# Patient Record
Sex: FEMALE | Race: WHITE | NOT HISPANIC OR LATINO | Employment: OTHER | URBAN - METROPOLITAN AREA
[De-identification: names, ages, dates, MRNs, and addresses within clinical notes are randomized per-mention and may not be internally consistent; named-entity substitution may affect disease eponyms.]

---

## 2017-02-09 ENCOUNTER — HOSPITAL ENCOUNTER (OUTPATIENT)
Dept: RADIOLOGY | Facility: HOSPITAL | Age: 42
Discharge: HOME | End: 2017-02-09
Attending: INTERNAL MEDICINE
Payer: MEDICARE

## 2017-02-09 DIAGNOSIS — Z12.31 ENCOUNTER FOR SCREENING MAMMOGRAM FOR MALIGNANT NEOPLASM OF BREAST: ICD-10-CM

## 2017-02-09 DIAGNOSIS — Z12.39 SCREENING FOR BREAST CANCER: ICD-10-CM

## 2017-02-09 PROCEDURE — 77063 BREAST TOMOSYNTHESIS BI: CPT | Mod: 26,,, | Performed by: RADIOLOGY

## 2017-02-09 PROCEDURE — 77067 SCR MAMMO BI INCL CAD: CPT | Mod: TC

## 2017-02-09 PROCEDURE — 77067 SCR MAMMO BI INCL CAD: CPT | Mod: 26,,, | Performed by: RADIOLOGY

## 2017-02-12 ENCOUNTER — TELEPHONE (OUTPATIENT)
Dept: FAMILY MEDICINE | Facility: CLINIC | Age: 42
End: 2017-02-12

## 2017-05-30 ENCOUNTER — OFFICE VISIT (OUTPATIENT)
Dept: FAMILY MEDICINE | Facility: CLINIC | Age: 42
End: 2017-05-30
Payer: MEDICARE

## 2017-05-30 VITALS
DIASTOLIC BLOOD PRESSURE: 75 MMHG | HEIGHT: 55 IN | SYSTOLIC BLOOD PRESSURE: 113 MMHG | WEIGHT: 142 LBS | HEART RATE: 69 BPM | BODY MASS INDEX: 32.86 KG/M2

## 2017-05-30 VITALS
HEART RATE: 69 BPM | BODY MASS INDEX: 33.07 KG/M2 | HEIGHT: 55 IN | WEIGHT: 142.88 LBS | SYSTOLIC BLOOD PRESSURE: 113 MMHG | DIASTOLIC BLOOD PRESSURE: 75 MMHG

## 2017-05-30 DIAGNOSIS — E03.4 HYPOTHYROIDISM DUE TO ACQUIRED ATROPHY OF THYROID: ICD-10-CM

## 2017-05-30 DIAGNOSIS — R53.83 FATIGUE, UNSPECIFIED TYPE: ICD-10-CM

## 2017-05-30 DIAGNOSIS — R78.71 ABNORMAL LEAD LEVEL IN BLOOD: ICD-10-CM

## 2017-05-30 DIAGNOSIS — E55.9 VITAMIN D DEFICIENCY: ICD-10-CM

## 2017-05-30 DIAGNOSIS — Q90.9 DOWN SYNDROME: ICD-10-CM

## 2017-05-30 DIAGNOSIS — Z13.1 SCREENING FOR DIABETES MELLITUS: ICD-10-CM

## 2017-05-30 DIAGNOSIS — Z00.00 ENCOUNTER FOR PREVENTIVE HEALTH EXAMINATION: Primary | ICD-10-CM

## 2017-05-30 DIAGNOSIS — K21.9 GERD WITHOUT ESOPHAGITIS: Chronic | ICD-10-CM

## 2017-05-30 DIAGNOSIS — E78.00 HYPERCHOLESTEREMIA: Chronic | ICD-10-CM

## 2017-05-30 DIAGNOSIS — E66.01 MORBID OBESITY WITH BMI OF 40.0-44.9, ADULT: Chronic | ICD-10-CM

## 2017-05-30 DIAGNOSIS — E66.01 MORBID OBESITY WITH BMI OF 40.0-44.9, ADULT: Primary | Chronic | ICD-10-CM

## 2017-05-30 DIAGNOSIS — I10 BENIGN HYPERTENSION: ICD-10-CM

## 2017-05-30 PROCEDURE — G0439 PPPS, SUBSEQ VISIT: HCPCS | Mod: S$GLB,,, | Performed by: NURSE PRACTITIONER

## 2017-05-30 PROCEDURE — 99999 PR PBB SHADOW E&M-EST. PATIENT-LVL III: CPT | Mod: PBBFAC,,, | Performed by: NURSE PRACTITIONER

## 2017-05-30 PROCEDURE — 99499 UNLISTED E&M SERVICE: CPT | Mod: S$GLB,,, | Performed by: INTERNAL MEDICINE

## 2017-05-30 PROCEDURE — 99999 PR PBB SHADOW E&M-EST. PATIENT-LVL III: CPT | Mod: PBBFAC,,, | Performed by: INTERNAL MEDICINE

## 2017-05-30 PROCEDURE — 99214 OFFICE O/P EST MOD 30 MIN: CPT | Mod: S$GLB,,, | Performed by: INTERNAL MEDICINE

## 2017-05-30 NOTE — PATIENT INSTRUCTIONS
Counseling and Referral of Other Preventative  (Italic type indicates deductible and co-insurance are waived)    Patient Name: Ira Cooley  Today's Date: 5/30/2017      SERVICE LIMITATIONS RECOMMENDATION    Vaccines    · Pneumococcal (once after 65)    · Influenza (annually)    · Hepatitis B (if medium/high risk)    · Prevnar 13      Hepatitis B medium/high risk factors:       - End-stage renal disease       - Hemophiliacs who received Factor VII or         IX concentrates       - Clients of institutions for the mentally             retarded       - Persons who live in the same house as          a HepB carrier       - Homosexual men       - Illicit injectable drug abusers     Pneumococcal: N/A     Influenza: N/A     Hepatitis B: N/A     Prevnar 13: N/A    Mammogram (biennial age 50-74)  Annually (age 40 or over)  Done this year, repeat every year    Pap (up to age 70 and after 70 if unknown history or abnormal study last 10 years)    Recommended to patient, declined     The USPSTF recommends screening for cervical cancer in women age 21 to 65 years with cytology (Pap smear) every 3 years.     Colorectal cancer screening (to age 75)    · Fecal occult blood test (annual)  · Flexible sigmoidoscopy (5y)  · Screening colonoscopy (10y)  · Barium enema   N/A    Diabetes self-management training (no USPSTF recommendations)  Requires referral by treating physician for patient with diabetes or renal disease. 10 hours of initial DSMT sessions of no less than 30 minutes each in a continuous 12-month period. 2 hours of follow-up DSMT in subsequent years.  N/A    Bone mass measurements (age 65 & older, biennial)  Requires diagnosis related to osteoporosis or estrogen deficiency. Biennial benefit unless patient has history of long-term glucocorticoid  N/A    Glaucoma screening (no USPSTF recommendation)  Diabetes mellitus, family history   , age 50 or over    American, age 65 or over  N/A    Medical  nutrition therapy for diabetes or renal disease (no recommended schedule)  Requires referral by treating physician for patient with diabetes or renal disease or kidney transplant within the past 3 years.  Can be provided in same year as diabetes self-management training (DSMT), and CMS recommends medical nutrition therapy take place after DSMT. Up to 3 hours for initial year and 2 hours in subsequent years.  N/A    Cardiovascular screening blood tests (every 5 years)  · Fasting lipid panel  Order as a panel if possible  Last done 2016, recommend to repeat every 5  years    Diabetes screening tests (at least every 3 years, Medicare covers annually or at 6-month intervals for prediabetic patients)  · Fasting blood sugar (FBS) or glucose tolerance test (GTT)  Patient must be diagnosed with one of the following:       - Hypertension       - Dyslipidemia       - Obesity (BMI 30kg/m2)       - Previous elevated impaired FBS or GTT       ... or any two of the following:       - Overweight (BMI 25 but <30)       - Family history of diabetes       - Age 65 or older       - History of gestational diabetes or birth of baby weighing more than 9 pounds  Last done 2016, recommend to repeat every 1  years    Abdominal aortic aneurysm screening (once)  · Sonogram   Limited to patients who meet one of the following criteria:       - Men who are 65-75 years old and have smoked more than 100 cigarette in their lifetime       - Anyone with a family history of abdominal aortic aneurysm       - Anyone recommended for screening by the USPSTF  N/A    HIV screening (annually for increased risk patients)  · HIV-1 and HIV-2 by EIA, or PENELOPE, rapid antibody test or oral mucosa transudate  Patients must be at increased risk for HIV infection per USPSTF guidelines or pregnant. Tests covered annually for patient at increased risk or as requested by the patient. Pregnant patients may receive up to 3 tests during pregnancy.  Risks discussed,  screening is not recommended    Smoking cessation counseling (up to 8 sessions per year)  Patients must be asymptomatic of tobacco-related conditions to receive as a preventative service.  Non-smoker    Subsequent annual wellness visit  At least 12 months since last AWV  Return in one year     The following information is provided to all patients.  This information is to help you find resources for any of the problems found today that may be affecting your health:                Living healthy guide: www.[facility].[place].[facility]      Understanding Diabetes: www.diabetes.org      Eating healthy: www.cdc.gov/healthyweight      [facility] home safety checklist: www.cdc.gov/steadi/patient.html      Agency on Aging: www.goea.[place]      Alcoholics anonymous (AA): www.aa.org      Physical Activity: www.justin.nih.gov/fu5jutd      Tobacco use: www.quitwithusla.org

## 2017-05-30 NOTE — PATIENT INSTRUCTIONS
Reviewed recent lab,discussed  risk factors, and recommended to continue his wellness program, exercises, diet, meds, and  wt. Control. See dictation.zee

## 2017-05-30 NOTE — PROGRESS NOTES
A 41-year-old white female with Down's syndrome presents with her mother today   for reevaluation and monitoring of her medical problems and condition.    During the interim, the patient has had no significant complaints, ER visits or   hospitalizations since the last visit on 11/30/2016.  She seem to be doing well   at the present time and importantly has finally entered into an exercise   activity and weight reduction-type program, which we have been counseling and   advising in the past.  She seems to feel better, but her weight is approximately   at the same at 141 pounds.  BMI estimated as morbidly obese, but questionable   regarding her height, weight and Down's syndrome.  Regarding   hypercholesterolemia, the patient is on Pravachol because of a significant   elevation.  We will repeat again as she is due her comprehensive lab.    Regarding Down's syndrome, stable, is under the care and apparently has had no   behavior abnormalities or depression or further symptoms of anxiety, etc.    Hypothyroidism, on replacement therapy and we will repeat again the TSH.  No   definite historic elements of replacement, abnormality or need.  Fatigue noted   in the past, but improved with her exercise recently.    The patient has had GERD in the past, but has been under complete control.    Indication, risk and benefits of possibly discontinuation if possible without   symptoms and will advise the mother and the patient regarding this because of   long-term side effects and will call back if symptoms return.    REVIEW OF SYSTEMS:  GENERAL:  Otherwise unremarkable.  There have been no evidence of any fever,   chills or sweats.  HEENT:  No visual changes, headache or syncope, but reminded again regarding her   need for routine eye exam.  RESPIRATORY:  No wheeze, cough or shortness of breath.  HEART:  No angina pain.  No palpitation or edema.  GASTROINTESTINAL:  Appetite fairly good and no change.  No change in BMs, melena   or  hematochezia, dysuria, hematuria or renal colic.  NEUROLOGIC:  No evidence of depression or change in behavior, weakness or   syncopal-like spells or tremor by history.    Immunizations are reviewed, up-to-date and will be due her Prevnar 13 come November.    PHYSICAL EXAMINATION:  GENERAL:  Reveals a well-developed, well-nourished overweight white female.  VITAL SIGNS:  See vital signs and measurement.  ARTERIES AND VEINS:  Good peripheral pulsation.  Normal carotid upstroke and   amplitude.  There was no edema or phlebitis.  NODES:  None felt in the neck, submandibular or supraclavicular region.  NEUROLOGICAL:  Oriented x3.  Relatively good motor tone and strength.  Cranial   nerves grossly intact II-XII.  Gait normal.  No fall risk observed.  No tremor.  HEENT:  Pupils are equal and reactive to light and accommodation.  External eye   movements were intact.  NECK:  No masses or thyroid.  LUNGS:  Clear.  Distant tones.  HEART:  Regular rhythm, 68 beats per minute.  No gallop or arrhythmia.  ABDOMINAL:  Soft.  No localized pain or tenderness.  No organs or masses felt at   this time.  Bowel tones are present.  EXTREMITIES:  Again, no edema, acute arthritis or phlebitis.    IMPRESSION:  At this time:  1.  Obesity, stable and improved.  See above discussion and recommendations to   continue lifestyle measures and emphasized weight reduction with her wellness   program.  2.  Hypercholesterolemia.  We will reevaluate with lab and continue present   medications  3.  Down's syndrome.  See above discussion and no significant change or   abnormality by history.  4.  Hypothyroidism, euthyroid.  We will repeat a TSH to confirm and need for any   change in replacement.  5.  GERD, stable.  We will attempt again discontinuation of her Prilosec if   possible because of long-term side effects.  Call or contact if symptoms recur   and place the medicine back on her med list.    The patient and mother both were counseled  regarding her present condition, to   continue her wellness program, medications and laboratory work and we will   review and contact regarding the results.  Routine visit in six months or as   needed.  Precautions given regarding any infection or change in her symptoms.      WTB/PN  dd: 05/30/2017 11:12:36 (CDT)  td: 05/31/2017 08:06:06 (CDT)  Doc ID   #[id number]  Job ID #[id number]    CC:    This office note has been dictated.

## 2017-05-30 NOTE — PROGRESS NOTES
"Ira Cooley presented for a  Medicare AWV and comprehensive Health Risk Assessment today. The following components were reviewed and updated:    · Medical history  · Family History  · Social history  · Allergies and Current Medications  · Health Risk Assessment  · Health Maintenance  · Care Team     ** See Completed Assessments for Annual Wellness Visit within the encounter summary.**       The following assessments were completed:  · Living Situation  · CAGE  · Depression Screening  · Timed Get Up and Go  · Whisper Test  · Cognitive Function Screening  · Nutrition Screening  · ADL Screening  · PAQ Screening    Vitals:    05/30/17 0907   BP: 113/75   BP Location: Right arm   Patient Position: Sitting   BP Method: Automatic   Pulse: 69   Weight: 64.8 kg (142 lb 13.7 oz)   Height: 4' 2" (1.27 m)     Body mass index is 40.18 kg/m².  Physical Exam   Constitutional: She appears well-developed and well-nourished. No distress.   HENT:   Head: Atraumatic.   Cardiovascular: Normal rate, regular rhythm and normal heart sounds.    No murmur heard.  Pulmonary/Chest: Effort normal and breath sounds normal. No respiratory distress. She has no wheezes.   Neurological: She is alert.   Skin: Skin is warm and dry.   Psychiatric: She has a normal mood and affect. Her behavior is normal. Judgment and thought content normal.         Diagnoses and health risks identified today and associated recommendations/orders:    1. Encounter for preventive health examination  Recommend yearly HRA visit    2. Morbid obesity with BMI of 40.0-44.9, adult  Encourage healthy food choices, portion control  Increase daily physical activity  Followed by Dave Hahn MD .       3. Hypercholesteremia  Continue to monitor  Taking statin  Encourage healthy food choices  Followed by Dave Hahn MD .       4. Down syndrome  Stable.     Followed by Dave Hahn MD .       5. Hypothyroidism due to acquired atrophy of thyroid  Stable.   Taking " synthroid  Followed by Dave Hahn MD .       Discussed pap smear, pt declines at this time  Provided Ira with a 5-10 year written screening schedule and personal prevention plan. Recommendations were developed using the USPSTF age appropriate recommendations. Education, counseling, and referrals were provided as needed. After Visit Summary printed and given to patient which includes a list of additional screenings\tests needed.    Return in about 1 year (around 5/30/2018).    Matilde Allan NP

## 2017-06-01 ENCOUNTER — LAB VISIT (OUTPATIENT)
Dept: LAB | Facility: HOSPITAL | Age: 42
End: 2017-06-01
Attending: INTERNAL MEDICINE
Payer: MEDICARE

## 2017-06-01 DIAGNOSIS — R53.83 FATIGUE, UNSPECIFIED TYPE: ICD-10-CM

## 2017-06-01 DIAGNOSIS — E78.00 HYPERCHOLESTEREMIA: Chronic | ICD-10-CM

## 2017-06-01 DIAGNOSIS — Z13.1 SCREENING FOR DIABETES MELLITUS: ICD-10-CM

## 2017-06-01 DIAGNOSIS — R78.71 ABNORMAL LEAD LEVEL IN BLOOD: ICD-10-CM

## 2017-06-01 DIAGNOSIS — I10 BENIGN HYPERTENSION: ICD-10-CM

## 2017-06-01 DIAGNOSIS — E55.9 VITAMIN D DEFICIENCY: ICD-10-CM

## 2017-06-01 LAB
25(OH)D3+25(OH)D2 SERPL-MCNC: 59 NG/ML
ALBUMIN SERPL BCP-MCNC: 3.5 G/DL
ALP SERPL-CCNC: 85 U/L
ALT SERPL W/O P-5'-P-CCNC: 24 U/L
ANION GAP SERPL CALC-SCNC: 13 MMOL/L
AST SERPL-CCNC: 25 U/L
BASOPHILS # BLD AUTO: 0.07 K/UL
BASOPHILS NFR BLD: 1.4 %
BILIRUB SERPL-MCNC: 0.6 MG/DL
BUN SERPL-MCNC: 10 MG/DL
CALCIUM SERPL-MCNC: 9.3 MG/DL
CHLORIDE SERPL-SCNC: 106 MMOL/L
CHOLEST/HDLC SERPL: 4.3 {RATIO}
CO2 SERPL-SCNC: 21 MMOL/L
CREAT SERPL-MCNC: 0.7 MG/DL
DIFFERENTIAL METHOD: ABNORMAL
EOSINOPHIL # BLD AUTO: 0.1 K/UL
EOSINOPHIL NFR BLD: 2.6 %
ERYTHROCYTE [DISTWIDTH] IN BLOOD BY AUTOMATED COUNT: 14 %
EST. GFR  (AFRICAN AMERICAN): >60 ML/MIN/1.73 M^2
EST. GFR  (NON AFRICAN AMERICAN): >60 ML/MIN/1.73 M^2
ESTIMATED AVG GLUCOSE: 120 MG/DL
GLUCOSE SERPL-MCNC: 93 MG/DL
HBA1C MFR BLD HPLC: 5.8 %
HCT VFR BLD AUTO: 43.5 %
HDL/CHOLESTEROL RATIO: 23 %
HDLC SERPL-MCNC: 200 MG/DL
HDLC SERPL-MCNC: 46 MG/DL
HGB BLD-MCNC: 14.5 G/DL
LDLC SERPL CALC-MCNC: 117.8 MG/DL
LYMPHOCYTES # BLD AUTO: 1.3 K/UL
LYMPHOCYTES NFR BLD: 26.9 %
MCH RBC QN AUTO: 33.3 PG
MCHC RBC AUTO-ENTMCNC: 33.3 %
MCV RBC AUTO: 100 FL
MONOCYTES # BLD AUTO: 0.4 K/UL
MONOCYTES NFR BLD: 8.3 %
NEUTROPHILS # BLD AUTO: 3 K/UL
NEUTROPHILS NFR BLD: 60.4 %
NONHDLC SERPL-MCNC: 154 MG/DL
PLATELET # BLD AUTO: 272 K/UL
PMV BLD AUTO: 10.2 FL
POTASSIUM SERPL-SCNC: 4.4 MMOL/L
PROT SERPL-MCNC: 7.1 G/DL
RBC # BLD AUTO: 4.36 M/UL
SODIUM SERPL-SCNC: 140 MMOL/L
TRIGL SERPL-MCNC: 181 MG/DL
TSH SERPL DL<=0.005 MIU/L-ACNC: 2.04 UIU/ML
WBC # BLD AUTO: 4.95 K/UL

## 2017-06-01 PROCEDURE — 84443 ASSAY THYROID STIM HORMONE: CPT

## 2017-06-01 PROCEDURE — 85025 COMPLETE CBC W/AUTO DIFF WBC: CPT

## 2017-06-01 PROCEDURE — 80061 LIPID PANEL: CPT

## 2017-06-01 PROCEDURE — 80053 COMPREHEN METABOLIC PANEL: CPT

## 2017-06-01 PROCEDURE — 83036 HEMOGLOBIN GLYCOSYLATED A1C: CPT

## 2017-06-01 PROCEDURE — 36415 COLL VENOUS BLD VENIPUNCTURE: CPT | Mod: PO

## 2017-06-01 PROCEDURE — 82306 VITAMIN D 25 HYDROXY: CPT

## 2017-06-05 ENCOUNTER — TELEPHONE (OUTPATIENT)
Dept: FAMILY MEDICINE | Facility: CLINIC | Age: 42
End: 2017-06-05

## 2017-06-05 NOTE — TELEPHONE ENCOUNTER
----- Message from Suzanne Ambrose sent at 6/5/2017  9:41 AM CDT -----  Contact: Patient's Mother  Patient's Mother called advising that she received a missed call from Dr. Hahn's office with the test results of her daughter.  Please call mother back at [phone].  Patient's mother advised she is leaving the house in a little bit and may not be able to reach her phone.  She would like us to call again if she does not answer.  Thank you!

## 2017-07-28 DIAGNOSIS — E03.4 HYPOTHYROIDISM DUE TO ACQUIRED ATROPHY OF THYROID: ICD-10-CM

## 2017-07-30 RX ORDER — LEVOTHYROXINE SODIUM 25 UG/1
TABLET ORAL
Qty: 90 TABLET | Refills: 3 | Status: SHIPPED | OUTPATIENT
Start: 2017-07-30 | End: 2018-08-29 | Stop reason: SDUPTHER

## 2017-09-12 ENCOUNTER — OFFICE VISIT (OUTPATIENT)
Dept: OBSTETRICS AND GYNECOLOGY | Facility: CLINIC | Age: 42
End: 2017-09-12
Payer: MEDICARE

## 2017-09-12 ENCOUNTER — LAB VISIT (OUTPATIENT)
Dept: LAB | Facility: HOSPITAL | Age: 42
End: 2017-09-12
Attending: OBSTETRICS & GYNECOLOGY
Payer: MEDICARE

## 2017-09-12 VITALS
BODY MASS INDEX: 39.18 KG/M2 | DIASTOLIC BLOOD PRESSURE: 74 MMHG | WEIGHT: 139.31 LBS | SYSTOLIC BLOOD PRESSURE: 122 MMHG

## 2017-09-12 DIAGNOSIS — N91.2 ABSENT MENSES: Primary | ICD-10-CM

## 2017-09-12 DIAGNOSIS — N91.2 ABSENT MENSES: ICD-10-CM

## 2017-09-12 LAB
ESTRADIOL SERPL-MCNC: 13 PG/ML
FSH SERPL-ACNC: 66.3 MIU/ML
TSH SERPL DL<=0.005 MIU/L-ACNC: 2.31 UIU/ML

## 2017-09-12 PROCEDURE — 3008F BODY MASS INDEX DOCD: CPT | Mod: S$GLB,,, | Performed by: OBSTETRICS & GYNECOLOGY

## 2017-09-12 PROCEDURE — 99203 OFFICE O/P NEW LOW 30 MIN: CPT | Mod: S$GLB,,, | Performed by: OBSTETRICS & GYNECOLOGY

## 2017-09-12 PROCEDURE — 99999 PR PBB SHADOW E&M-EST. PATIENT-LVL III: CPT | Mod: PBBFAC,,, | Performed by: OBSTETRICS & GYNECOLOGY

## 2017-09-12 PROCEDURE — 83001 ASSAY OF GONADOTROPIN (FSH): CPT

## 2017-09-12 PROCEDURE — 82670 ASSAY OF TOTAL ESTRADIOL: CPT

## 2017-09-12 PROCEDURE — 84443 ASSAY THYROID STIM HORMONE: CPT

## 2017-09-12 PROCEDURE — 3078F DIAST BP <80 MM HG: CPT | Mod: S$GLB,,, | Performed by: OBSTETRICS & GYNECOLOGY

## 2017-09-12 PROCEDURE — 3074F SYST BP LT 130 MM HG: CPT | Mod: S$GLB,,, | Performed by: OBSTETRICS & GYNECOLOGY

## 2017-09-12 PROCEDURE — 36415 COLL VENOUS BLD VENIPUNCTURE: CPT | Mod: PO

## 2017-09-12 NOTE — PROGRESS NOTES
Chief Complaint   Patient presents with    Well Woman    Absent Menses       History of Present Illness: Ira Cooley is a 41 y.o. female that presents today 2017 for   Chief Complaint   Patient presents with    Well Woman    Absent Menses         Past Medical History:   Diagnosis Date    GERD (gastroesophageal reflux disease)     Thyroid activity decreased        Past Surgical History:   Procedure Laterality Date    TONSILLECTOMY         Current Outpatient Prescriptions   Medication Sig Dispense Refill    BIOTIN ORAL Take 1 tablet by mouth once daily.        ERGOCALCIFEROL, VITAMIN D2, (VITAMIN D2 ORAL) Take by mouth once daily.      levothyroxine (SYNTHROID) 25 MCG tablet TAKE 1 TABLET EVERY DAY 90 tablet 3    MULTIVIT WITH CALCIUM,IRON,MIN (MULTIPLE VITAMIN, WOMENS ORAL) Take by mouth.        pravastatin (PRAVACHOL) 40 MG tablet TAKE 1 TABLET ONE TIME DAILY 90 tablet 3    vitamin E 100 UNIT capsule Take 100 Units by mouth once daily.         No current facility-administered medications for this visit.        Review of patient's allergies indicates:   Allergen Reactions    Adacel (adolescent & adult) [diph,pertuss(acel),tet vac(pf)] Other (See Comments)     Redness, pain, and induration at injection site.       Family History   Problem Relation Age of Onset    Cataracts Maternal Aunt     Diabetes Maternal Aunt     Stroke Maternal Aunt     Thyroid disease Maternal Aunt     Cancer Maternal Aunt     Cancer Maternal Uncle     Cancer Maternal Grandmother     Cancer Maternal Grandfather        Social History   Substance Use Topics    Smoking status: Never Smoker    Smokeless tobacco: Never Used    Alcohol use No       OB History    Para Term  AB Living   0 0 0 0 0 0   SAB TAB Ectopic Multiple Live Births   0 0 0 0 0             Review of Symptoms:  GENERAL: Denies weight gain or weight loss. Feeling well overall.   SKIN: Denies rash or lesions.   HEAD: Denies head injury or  headache.   NODES: Denies enlarged lymph nodes.   CHEST: Denies chest pain or shortness of breath.   CARDIOVASCULAR: Denies palpitations or left sided chest pain.   ABDOMEN: No abdominal pain, constipation, diarrhea, nausea, vomiting or rectal bleeding.   URINARY: No frequency, dysuria, hematuria, or burning on urination.  HEMATOLOGIC: No easy bruisability or excessive bleeding.   MUSCULOSKELETAL: Denies joint pain or swelling.     /74   Wt 63.2 kg (139 lb 5.3 oz)       ASSESSMENT/PLAN:  Absent menses  -     Follicle stimulating hormone; Future; Expected date: 09/12/2017  -     Estradiol; Future; Expected date: 09/12/2017  -     TSH; Future; Expected date: 09/12/2017        30 minutes spent today with greater than half in counseling.

## 2017-10-26 ENCOUNTER — TELEPHONE (OUTPATIENT)
Dept: OBSTETRICS AND GYNECOLOGY | Facility: CLINIC | Age: 42
End: 2017-10-26

## 2017-10-26 DIAGNOSIS — N95.0 POSTMENOPAUSAL BLEEDING: Primary | ICD-10-CM

## 2017-10-26 NOTE — TELEPHONE ENCOUNTER
----- Message from John Jordan sent at 10/26/2017  1:26 PM CDT -----  Contact: Mom,Yessenia  Yessenia want to speak with a nurse regarding patient period please call back at [phone]

## 2017-10-26 NOTE — TELEPHONE ENCOUNTER
Pts mother states she hasn't had a cycle since may, was seen for absent menses and told she was in menopause. She started cycle yesterday please advise.

## 2017-10-31 ENCOUNTER — HOSPITAL ENCOUNTER (OUTPATIENT)
Dept: RADIOLOGY | Facility: HOSPITAL | Age: 42
Discharge: HOME | End: 2017-10-31
Attending: OBSTETRICS & GYNECOLOGY
Payer: MEDICARE

## 2017-10-31 DIAGNOSIS — N95.0 POSTMENOPAUSAL BLEEDING: ICD-10-CM

## 2017-10-31 PROCEDURE — 76856 US EXAM PELVIC COMPLETE: CPT | Mod: TC

## 2017-10-31 PROCEDURE — 76856 US EXAM PELVIC COMPLETE: CPT | Mod: 26,,, | Performed by: RADIOLOGY

## 2017-11-16 RX ORDER — PRAVASTATIN SODIUM 40 MG/1
TABLET ORAL
Qty: 90 TABLET | Refills: 3 | Status: SHIPPED | OUTPATIENT
Start: 2017-11-16 | End: 2018-08-29 | Stop reason: SDUPTHER

## 2017-12-29 ENCOUNTER — TELEPHONE (OUTPATIENT)
Dept: FAMILY MEDICINE | Facility: CLINIC | Age: 42
End: 2017-12-29

## 2017-12-29 NOTE — TELEPHONE ENCOUNTER
----- Message from Sonya Blanco sent at 12/28/2017  3:15 PM CST -----  Contact: Mother-Pedrito [phone]  Patient's mother returning the nurse phone call.Thanks!

## 2017-12-29 NOTE — TELEPHONE ENCOUNTER
Left message for mother to call clinic for any further needs. Has appointment scheduled with Dr Pretty, advised to schedule with  NP if sooner appointment is needed.

## 2018-03-26 ENCOUNTER — OFFICE VISIT (OUTPATIENT)
Dept: FAMILY MEDICINE | Facility: CLINIC | Age: 43
End: 2018-03-26
Payer: MEDICARE

## 2018-03-26 VITALS
RESPIRATION RATE: 18 BRPM | DIASTOLIC BLOOD PRESSURE: 62 MMHG | HEART RATE: 93 BPM | WEIGHT: 137.81 LBS | OXYGEN SATURATION: 95 % | SYSTOLIC BLOOD PRESSURE: 108 MMHG | HEIGHT: 55 IN | BODY MASS INDEX: 31.89 KG/M2

## 2018-03-26 DIAGNOSIS — Z00.00 ROUTINE PHYSICAL EXAMINATION: Primary | ICD-10-CM

## 2018-03-26 DIAGNOSIS — E03.4 HYPOTHYROIDISM DUE TO ACQUIRED ATROPHY OF THYROID: ICD-10-CM

## 2018-03-26 DIAGNOSIS — E78.5 DYSLIPIDEMIA: ICD-10-CM

## 2018-03-26 DIAGNOSIS — K21.9 GERD WITHOUT ESOPHAGITIS: Chronic | ICD-10-CM

## 2018-03-26 DIAGNOSIS — Z12.31 ENCOUNTER FOR SCREENING MAMMOGRAM FOR BREAST CANCER: ICD-10-CM

## 2018-03-26 DIAGNOSIS — E66.9 OBESITY (BMI 35.0-39.9 WITHOUT COMORBIDITY): ICD-10-CM

## 2018-03-26 DIAGNOSIS — Q90.9 DOWN SYNDROME: ICD-10-CM

## 2018-03-26 PROCEDURE — 90686 IIV4 VACC NO PRSV 0.5 ML IM: CPT | Mod: S$GLB,,, | Performed by: INTERNAL MEDICINE

## 2018-03-26 PROCEDURE — G0008 ADMIN INFLUENZA VIRUS VAC: HCPCS | Mod: S$GLB,,, | Performed by: INTERNAL MEDICINE

## 2018-03-26 PROCEDURE — 99396 PREV VISIT EST AGE 40-64: CPT | Mod: S$GLB,,, | Performed by: INTERNAL MEDICINE

## 2018-03-26 PROCEDURE — 99499 UNLISTED E&M SERVICE: CPT | Mod: S$GLB,,, | Performed by: INTERNAL MEDICINE

## 2018-03-26 PROCEDURE — 99999 PR PBB SHADOW E&M-EST. PATIENT-LVL III: CPT | Mod: PBBFAC,,, | Performed by: INTERNAL MEDICINE

## 2018-03-26 NOTE — PROGRESS NOTES
Subjective:       Patient ID: Ira Cooley is a 42 y.o. female.    Chief Complaint: Annual Exam    Here for routine health maintenance.    Down syndrome - lives with her mother who cares for her.  Exercising 4-5x per week with mother.    Obesity - lost 4-5lb exercising  Hypothryoid - was mild but was having hair loss at dx.  Controlled  HLD - controlled LDL; mildly high triglycerides      Review of Systems   Constitutional: Negative for appetite change and fever.   HENT: Negative for nosebleeds and trouble swallowing.    Eyes: Negative for discharge and visual disturbance.   Respiratory: Negative for choking and shortness of breath.    Cardiovascular: Negative for chest pain and palpitations.   Gastrointestinal: Negative for abdominal pain, nausea and vomiting.   Musculoskeletal: Negative for arthralgias and joint swelling.   Skin: Negative for rash and wound.   Neurological: Negative for dizziness and syncope.   Psychiatric/Behavioral: Negative for confusion and dysphoric mood.       Objective:      Vitals:    03/26/18 1349   BP: 108/62   Pulse: 93   Resp: 18     Physical Exam   Constitutional: She appears well-nourished.   Eyes: Conjunctivae and EOM are normal.   Neck: Trachea normal and normal range of motion. No thyromegaly present.   Cardiovascular: Normal heart sounds.    Edema negative   Pulmonary/Chest: Effort normal and breath sounds normal.   Abdominal: Soft. There is no hepatomegaly.   Musculoskeletal:   ROM normal bilateral  Strength normal bilateral   Neurological: She has normal reflexes. No cranial nerve deficit.   Skin: Skin is warm, dry and intact.   Psychiatric: She has a normal mood and affect.   Alert and Oriented    Vitals reviewed.        Assessment:       1. Routine physical examination    2. Dyslipidemia    3. Hypothyroidism due to acquired atrophy of thyroid    4. Down syndrome    5. GERD without esophagitis    6. Obesity (BMI 35.0-39.9 without comorbidity)    7. Encounter for screening  "mammogram for breast cancer        Plan:       Routine physical examination    Dyslipidemia  -     Lipid panel; Future; Expected date: 03/26/2018  -     Comprehensive metabolic panel; Future; Expected date: 03/26/2018    Hypothyroidism due to acquired atrophy of thyroid  -     TSH; Future; Expected date: 03/26/2018    Down syndrome    GERD without esophagitis    Obesity (BMI 35.0-39.9 without comorbidity)    Encounter for screening mammogram for breast cancer  -     Mammo Digital Screening Bilat with CAD; Future; Expected date: 03/26/2018    wellness reviewed        Counseled on regular exercise, maintenance of a healthy weight, balanced diet rich in fruits/vegetables and lean protein, and avoidance of unhealthy habits like smoking and excessive alcohol intake.   Also, counseled on importance of being compliant with medication, health appointments, diet and exercise.     Follow-up in about 1 year (around 3/26/2019). c mother    "This note will not be shared with the patient."  "

## 2018-04-05 NOTE — PROGRESS NOTES
Patient, Ira Cooley (MRN #[id number]), presented with a recorded BMI of 38.75 kg/m^2 and a documented comorbidity(s):  - Hyperlipidemia  to which the severe obesity is a contributing factor. This is consistent with the definition of severe obesity (BMI 35.0-35.9) with comorbidity (ICD-10 E66.01, Z68.35). The patient's severe obesity was monitored, evaluated, addressed and/or treated. This addendum to the medical record is made on 04/05/2018.

## 2018-04-07 ENCOUNTER — HOSPITAL ENCOUNTER (OUTPATIENT)
Dept: RADIOLOGY | Facility: HOSPITAL | Age: 43
Discharge: HOME | End: 2018-04-07
Attending: INTERNAL MEDICINE
Payer: MEDICARE

## 2018-04-07 DIAGNOSIS — Z12.31 ENCOUNTER FOR SCREENING MAMMOGRAM FOR BREAST CANCER: ICD-10-CM

## 2018-04-07 PROCEDURE — 77067 SCR MAMMO BI INCL CAD: CPT | Mod: 26,,, | Performed by: RADIOLOGY

## 2018-04-07 PROCEDURE — 77063 BREAST TOMOSYNTHESIS BI: CPT | Mod: 26,,, | Performed by: RADIOLOGY

## 2018-04-07 PROCEDURE — 77067 SCR MAMMO BI INCL CAD: CPT | Mod: TC,PO

## 2018-07-11 ENCOUNTER — OFFICE VISIT (OUTPATIENT)
Dept: FAMILY MEDICINE | Facility: CLINIC | Age: 43
End: 2018-07-11
Payer: MEDICARE

## 2018-07-11 VITALS
DIASTOLIC BLOOD PRESSURE: 62 MMHG | SYSTOLIC BLOOD PRESSURE: 120 MMHG | HEART RATE: 97 BPM | HEIGHT: 55 IN | OXYGEN SATURATION: 94 % | BODY MASS INDEX: 32.14 KG/M2 | WEIGHT: 138.88 LBS

## 2018-07-11 DIAGNOSIS — E78.00 HYPERCHOLESTEREMIA: Chronic | ICD-10-CM

## 2018-07-11 DIAGNOSIS — E66.01 MORBID OBESITY: ICD-10-CM

## 2018-07-11 DIAGNOSIS — Z00.00 ENCOUNTER FOR PREVENTIVE HEALTH EXAMINATION: Primary | ICD-10-CM

## 2018-07-11 DIAGNOSIS — Q90.9 DOWN SYNDROME: ICD-10-CM

## 2018-07-11 DIAGNOSIS — K21.9 GERD WITHOUT ESOPHAGITIS: Chronic | ICD-10-CM

## 2018-07-11 DIAGNOSIS — E03.4 HYPOTHYROIDISM DUE TO ACQUIRED ATROPHY OF THYROID: ICD-10-CM

## 2018-07-11 PROCEDURE — 99999 PR PBB SHADOW E&M-EST. PATIENT-LVL IV: CPT | Mod: PBBFAC,,, | Performed by: NURSE PRACTITIONER

## 2018-07-11 PROCEDURE — 3074F SYST BP LT 130 MM HG: CPT | Mod: CPTII,S$GLB,, | Performed by: NURSE PRACTITIONER

## 2018-07-11 PROCEDURE — G0439 PPPS, SUBSEQ VISIT: HCPCS | Mod: S$GLB,,, | Performed by: NURSE PRACTITIONER

## 2018-07-11 PROCEDURE — 3078F DIAST BP <80 MM HG: CPT | Mod: CPTII,S$GLB,, | Performed by: NURSE PRACTITIONER

## 2018-07-11 NOTE — PATIENT INSTRUCTIONS
Counseling and Referral of Other Preventative  (Italic type indicates deductible and co-insurance are waived)    Patient Name: rIa Cooley  Today's Date: 7/11/2018    Health Maintenance       Date Due Completion Date    Influenza Vaccine 08/01/2018 3/26/2018    Mammogram 04/07/2019 4/7/2018    Pap Smear with HPV Cotest 07/11/2021 7/11/2018 (Declined)    Override on 7/11/2018: Declined    Override on 8/13/2015: Declined    TETANUS VACCINE 07/11/2024 7/11/2014        No orders of the defined types were placed in this encounter.    The following information is provided to all patients.  This information is to help you find resources for any of the problems found today that may be affecting your health:                Living healthy guide: www.[facility].[place].gov      Understanding Diabetes: www.diabetes.org      Eating healthy: www.cdc.gov/healthyweight      [facility] home safety checklist: www.cdc.gov/steadi/patient.html      Agency on Aging: www.goea.[place].[facility]      Alcoholics anonymous (AA): www.aa.org      Physical Activity: www.justin.nih.gov/ix6rsmx      Tobacco use: www.quitwithusla.org

## 2018-07-12 NOTE — PROGRESS NOTES
"Ira Cooley presented for a  Medicare AWV and comprehensive Health Risk Assessment today. The following components were reviewed and updated:    · Medical history  · Family History  · Social history  · Allergies and Current Medications  · Health Risk Assessment  · Health Maintenance  · Care Team     ** See Completed Assessments for Annual Wellness Visit within the encounter summary.**       The following assessments were completed:  · Living Situation  · CAGE  · Depression Screening  · Timed Get Up and Go  · Whisper Test  · Cognitive Function Screening    · Nutrition Screening  · ADL Screening  · PAQ Screening    Vitals:    07/11/18 1406   BP: 120/62   BP Location: Left arm   Patient Position: Sitting   BP Method: Medium (Manual)   Pulse: 97   SpO2: (!) 94%   Weight: 63 kg (138 lb 14.2 oz)   Height: 4' 2" (1.27 m)     Body mass index is 39.06 kg/m².  Physical Exam   Constitutional: She appears well-nourished. No distress.   Cardiovascular: Normal heart sounds.    Pulmonary/Chest: Effort normal and breath sounds normal. No respiratory distress.   Neurological: She is alert. No cranial nerve deficit.   Psychiatric: She has a normal mood and affect.   Vitals reviewed.        Diagnoses and health risks identified today and associated recommendations/orders:    1. Encounter for preventive health examination  Reviewed health maintenance and provided recommendations        2. Hypercholesteremia  Continue to monitor   Followed by Sebastien Pretty MD .      3. Morbid obesity  Continue daily exercise  Encourage healthy food choices  Followed by Sebastien Pretty MD .      4. Hypothyroidism due to acquired atrophy of thyroid  Continue to monitor   Followed by Sebastien Pretty MD .      5. GERD without esophagitis  Stable.   .  Followed by Sebastien Pretty MD .      6. Down syndrome  Stable.     Followed by Sebastien Pretty MD .        Provided Ira with a 5-10 year written screening schedule and personal prevention plan. Recommendations were " developed using the USPSTF age appropriate recommendations. Education, counseling, and referrals were provided as needed. After Visit Summary printed and given to patient which includes a list of additional screenings\tests needed.    Follow-up in about 1 year (around 7/11/2019).    Matilde Allan NP

## 2018-08-29 DIAGNOSIS — E03.4 HYPOTHYROIDISM DUE TO ACQUIRED ATROPHY OF THYROID: ICD-10-CM

## 2018-08-29 NOTE — TELEPHONE ENCOUNTER
----- Message from Niraj Kang sent at 8/29/2018  8:28 AM CDT -----  Contact: pt mother Yessenia  Type:  RX Refill Request    Who Called:  Yessenia  Refill or New Rx:  refill  RX Name and Strength:  levothyroxine (SYNTHROID) 25 MCG tablet and pravastatin (PRAVACHOL) 40 MG tablet  How is the patient currently taking it? (ex. 1XDay):  1 x day / 1 x day  Is this a 30 day or 90 day RX:  90 / 90  Preferred Pharmacy with phone number:      [facility] - [place], [place] - [address]  [address] [address]  Phone: [phone] Fax: [phone]      Local or Mail Order:  mail  Ordering Provider:    Best Call Back Number: [phone]    Additional Information:

## 2018-08-30 RX ORDER — PRAVASTATIN SODIUM 40 MG/1
TABLET ORAL
Qty: 90 TABLET | Refills: 3 | Status: SHIPPED | OUTPATIENT
Start: 2018-08-30 | End: 2019-09-18 | Stop reason: SDUPTHER

## 2018-08-30 RX ORDER — LEVOTHYROXINE SODIUM 25 UG/1
25 TABLET ORAL DAILY
Qty: 90 TABLET | Refills: 3 | Status: SHIPPED | OUTPATIENT
Start: 2018-08-30 | End: 2019-06-19 | Stop reason: SDUPTHER

## 2018-10-25 ENCOUNTER — OFFICE VISIT (OUTPATIENT)
Dept: OPTOMETRY | Facility: CLINIC | Age: 43
End: 2018-10-25
Payer: MEDICARE

## 2018-10-25 DIAGNOSIS — Z13.5 GLAUCOMA SCREENING: ICD-10-CM

## 2018-10-25 DIAGNOSIS — H20.819 HETEROCHROMIA OF IRIS, UNSPECIFIED LATERALITY: ICD-10-CM

## 2018-10-25 DIAGNOSIS — H43.393 VITREOUS FLOATERS, BILATERAL: ICD-10-CM

## 2018-10-25 DIAGNOSIS — H52.203 MYOPIA OF BOTH EYES WITH ASTIGMATISM: ICD-10-CM

## 2018-10-25 DIAGNOSIS — H52.13 MYOPIA OF BOTH EYES WITH ASTIGMATISM: ICD-10-CM

## 2018-10-25 DIAGNOSIS — H25.13 NUCLEAR SCLEROSIS, BILATERAL: Primary | ICD-10-CM

## 2018-10-25 DIAGNOSIS — Q90.9 DOWN SYNDROME: ICD-10-CM

## 2018-10-25 PROCEDURE — 99999 PR PBB SHADOW E&M-EST. PATIENT-LVL II: CPT | Mod: PBBFAC,,, | Performed by: OPTOMETRIST

## 2018-10-25 PROCEDURE — 92015 DETERMINE REFRACTIVE STATE: CPT | Mod: ,,, | Performed by: OPTOMETRIST

## 2018-10-25 PROCEDURE — 92014 COMPRE OPH EXAM EST PT 1/>: CPT | Mod: S$PBB,,, | Performed by: OPTOMETRIST

## 2018-10-25 PROCEDURE — 99212 OFFICE O/P EST SF 10 MIN: CPT | Mod: PBBFAC,PO | Performed by: OPTOMETRIST

## 2018-10-25 NOTE — PROGRESS NOTES
HPI     DLS- 3-1-2016     Pt states, her va is great  No changes that she can notice. No current   gtts. Denies eye pain.     Agree above  Happy with last specs  Watches tv and ipad/ near work without complaint      Last edited by KULDIP Loco, OD on 10/25/2018  5:03 PM. (History)        ROS     Negative for: Constitutional, Gastrointestinal, Neurological, Skin,   Genitourinary, Musculoskeletal, HENT, Endocrine, Cardiovascular, Eyes,   Respiratory, Psychiatric, Allergic/Imm, Heme/Lymph    Last edited by KULDIP oLco, OD on 10/25/2018  5:03 PM. (History)        Assessment /Plan     For exam results, see Encounter Report.    Nuclear sclerosis, bilateral    Vitreous floaters, bilateral    Glaucoma screening    Down syndrome    Heterochromia of iris, unspecified laterality    Myopia of both eyes with astigmatism      1. Very early changes OD>OS w/ some cortical component  Monitor for VA reduction  2. RD precautions given  3. Not suspect  4,5. No other ocular manifestations, no gross strab or amblyopia  6. Updated specs rx, gave copy  Fill prn, ok continue with current specs    Discussed and educated patient on current findings /plan.  RTC 1 year, prn if any changes / issues

## 2018-10-25 NOTE — PROGRESS NOTES
HPI     DLS- 3-1-2016     Pt states, her va is great  No changes that she can notice. No current   gtts. Denies eye pain.     Agree above  Happy with last specs  Watches tv and ipad/ near work without complaint      Last edited by KULDIP Loco, OD on 10/25/2018  5:03 PM. (History)        ROS     Negative for: Constitutional, Gastrointestinal, Neurological, Skin,   Genitourinary, Musculoskeletal, HENT, Endocrine, Cardiovascular, Eyes,   Respiratory, Psychiatric, Allergic/Imm, Heme/Lymph    Last edited by KULDIP Loco, OD on 10/25/2018  5:03 PM. (History)        Assessment /Plan     For exam results, see Encounter Report.    Nuclear sclerosis, bilateral    Vitreous floaters, bilateral    Glaucoma screening    Down syndrome    Myopia of both eyes with astigmatism        1. Very early changes OD>OS w/ some cortical component  Monitor for VA reduction  2. RD precautions given  3. Not suspect  4. No other ocular manifestations, no strab, no gross amblyopia  5. Updated specs rx, gave copy  Fill prn, ok continue with current specs    Discussed and educated patient on current findings /plan.  RTC 1 year, prn if any changes / issues

## 2019-03-26 ENCOUNTER — PATIENT OUTREACH (OUTPATIENT)
Dept: ADMINISTRATIVE | Facility: HOSPITAL | Age: 44
End: 2019-03-26

## 2019-04-09 ENCOUNTER — OFFICE VISIT (OUTPATIENT)
Dept: FAMILY MEDICINE | Facility: CLINIC | Age: 44
End: 2019-04-09
Payer: MEDICARE

## 2019-04-09 VITALS
WEIGHT: 129.19 LBS | OXYGEN SATURATION: 93 % | DIASTOLIC BLOOD PRESSURE: 72 MMHG | SYSTOLIC BLOOD PRESSURE: 114 MMHG | BODY MASS INDEX: 29.9 KG/M2 | HEIGHT: 55 IN | HEART RATE: 92 BPM

## 2019-04-09 DIAGNOSIS — E66.9 OBESITY (BMI 35.0-39.9 WITHOUT COMORBIDITY): ICD-10-CM

## 2019-04-09 DIAGNOSIS — E78.5 DYSLIPIDEMIA: ICD-10-CM

## 2019-04-09 DIAGNOSIS — Z00.00 ROUTINE PHYSICAL EXAMINATION: Primary | ICD-10-CM

## 2019-04-09 DIAGNOSIS — Q90.9 DOWN SYNDROME: ICD-10-CM

## 2019-04-09 DIAGNOSIS — Z12.31 ENCOUNTER FOR SCREENING MAMMOGRAM FOR BREAST CANCER: ICD-10-CM

## 2019-04-09 DIAGNOSIS — E03.4 HYPOTHYROIDISM DUE TO ACQUIRED ATROPHY OF THYROID: ICD-10-CM

## 2019-04-09 PROCEDURE — 99396 PR PREVENTIVE VISIT,EST,40-64: ICD-10-PCS | Mod: HCNC,S$GLB,, | Performed by: INTERNAL MEDICINE

## 2019-04-09 PROCEDURE — 3074F PR MOST RECENT SYSTOLIC BLOOD PRESSURE < 130 MM HG: ICD-10-PCS | Mod: HCNC,CPTII,S$GLB, | Performed by: INTERNAL MEDICINE

## 2019-04-09 PROCEDURE — 3078F DIAST BP <80 MM HG: CPT | Mod: HCNC,CPTII,S$GLB, | Performed by: INTERNAL MEDICINE

## 2019-04-09 PROCEDURE — 3078F PR MOST RECENT DIASTOLIC BLOOD PRESSURE < 80 MM HG: ICD-10-PCS | Mod: HCNC,CPTII,S$GLB, | Performed by: INTERNAL MEDICINE

## 2019-04-09 PROCEDURE — 99499 UNLISTED E&M SERVICE: CPT | Mod: HCNC,S$GLB,, | Performed by: INTERNAL MEDICINE

## 2019-04-09 PROCEDURE — 3074F SYST BP LT 130 MM HG: CPT | Mod: HCNC,CPTII,S$GLB, | Performed by: INTERNAL MEDICINE

## 2019-04-09 PROCEDURE — 99999 PR PBB SHADOW E&M-EST. PATIENT-LVL III: CPT | Mod: PBBFAC,HCNC,, | Performed by: INTERNAL MEDICINE

## 2019-04-09 PROCEDURE — 99999 PR PBB SHADOW E&M-EST. PATIENT-LVL III: ICD-10-PCS | Mod: PBBFAC,HCNC,, | Performed by: INTERNAL MEDICINE

## 2019-04-09 PROCEDURE — 99499 RISK ADDL DX/OHS AUDIT: ICD-10-PCS | Mod: HCNC,S$GLB,, | Performed by: INTERNAL MEDICINE

## 2019-04-09 PROCEDURE — 99396 PREV VISIT EST AGE 40-64: CPT | Mod: HCNC,S$GLB,, | Performed by: INTERNAL MEDICINE

## 2019-04-09 NOTE — PROGRESS NOTES
Subjective:       Patient ID: Ira Cooley is a 43 y.o. female.    Chief Complaint: Annual Exam    Here for routine health maintenance.    Down syndrome - lives with her mother who cares for her.  Exercising 4-5x per week with mother.    Obesity - lost 8 lb exercising  Hypothryoid - was mild but was having hair loss at dx.  Controlled  HLD - controlled LDL; mildly high triglycerides    Review of Systems   Constitutional: Negative for appetite change and fever.   HENT: Negative for nosebleeds and trouble swallowing.    Eyes: Negative for discharge and visual disturbance.   Respiratory: Negative for choking and shortness of breath.    Cardiovascular: Negative for chest pain and palpitations.   Gastrointestinal: Negative for abdominal pain, nausea and vomiting.   Musculoskeletal: Negative for arthralgias and joint swelling.   Skin: Negative for rash and wound.   Neurological: Negative for dizziness and syncope.   Psychiatric/Behavioral: Negative for confusion and dysphoric mood.       Objective:      Vitals:    04/09/19 1550   BP: 114/72   Pulse: 92     Physical Exam   Constitutional: She appears well-nourished.   Eyes: Conjunctivae and EOM are normal.   Neck: Trachea normal and normal range of motion. No thyromegaly present.   Cardiovascular: Normal heart sounds.   Edema negative   Pulmonary/Chest: Effort normal and breath sounds normal.   Abdominal: Soft. There is no hepatomegaly.   Neurological: No cranial nerve deficit.   DTR decreased bilateral   Skin: Skin is warm, dry and intact.   Psychiatric: She has a normal mood and affect.   Alert and Oriented    Vitals reviewed.        Assessment:       1. Routine physical examination    2. Hypothyroidism due to acquired atrophy of thyroid    3. Dyslipidemia    4. Encounter for screening mammogram for breast cancer    5. Obesity (BMI 35.0-39.9 without comorbidity)    6. Down syndrome        Plan:       Routine physical examination  -     Comprehensive metabolic panel;  Future; Expected date: 04/09/2019  -     CBC auto differential; Future; Expected date: 04/09/2019  -     Lipid panel; Future; Expected date: 04/09/2019  -     TSH; Future; Expected date: 04/09/2019    Hypothyroidism due to acquired atrophy of thyroid  -     TSH; Future; Expected date: 04/09/2019    Dyslipidemia  -     Comprehensive metabolic panel; Future; Expected date: 04/09/2019  -     Lipid panel; Future; Expected date: 04/09/2019  -     TSH; Future; Expected date: 04/09/2019    Encounter for screening mammogram for breast cancer  -     Mammo Digital Screening Bilat; Future; Expected date: 04/09/2019    Obesity (BMI 35.0-39.9 without comorbidity)  -     Comprehensive metabolic panel; Future; Expected date: 04/09/2019  -     CBC auto differential; Future; Expected date: 04/09/2019  -     Lipid panel; Future; Expected date: 04/09/2019  -     TSH; Future; Expected date: 04/09/2019    Down syndrome  -     Comprehensive metabolic panel; Future; Expected date: 04/09/2019  -     CBC auto differential; Future; Expected date: 04/09/2019  -     Lipid panel; Future; Expected date: 04/09/2019  -     TSH; Future; Expected date: 04/09/2019            Medication List with Changes/Refills   Current Medications    BIOTIN ORAL    Take 1 tablet by mouth once daily.      ERGOCALCIFEROL, VITAMIN D2, (VITAMIN D2 ORAL)    Take by mouth once daily.    LEVOTHYROXINE (SYNTHROID) 25 MCG TABLET    Take 1 tablet (25 mcg total) by mouth once daily.    MULTIVIT WITH CALCIUM,IRON,MIN (MULTIPLE VITAMIN, WOMENS ORAL)    Take by mouth.      PRAVASTATIN (PRAVACHOL) 40 MG TABLET    TAKE 1 TABLET ONE TIME DAILY    VITAMIN E 100 UNIT CAPSULE    Take 100 Units by mouth once daily.       Wellness reviewed   Continue current management and monitor.    Counseled on regular exercise, maintenance of a healthy weight, balanced diet rich in fruits/vegetables and lean protein, and avoidance of unhealthy habits like smoking and excessive alcohol intake.    Also, counseled on importance of being compliant with medication, health appointments, diet and exercise.     Follow up in about 1 year (around 4/9/2020).

## 2019-04-17 NOTE — PROGRESS NOTES
Patient, Ira Cooley (MRN #[id number]), presented with a recorded BMI of 36.33 kg/m^2 and a documented comorbidity(s):  - Hyperlipidemia  to which the severe obesity is a contributing factor. This is consistent with the definition of severe obesity (BMI 35.0-39.9) with comorbidity (ICD-10 E66.01, Z68.35). The patient's severe obesity was monitored, evaluated, addressed and/or treated. This addendum to the medical record is made on 04/17/2019.

## 2019-04-27 ENCOUNTER — HOSPITAL ENCOUNTER (OUTPATIENT)
Dept: RADIOLOGY | Facility: HOSPITAL | Age: 44
Discharge: HOME | End: 2019-04-27
Attending: INTERNAL MEDICINE
Payer: MEDICARE

## 2019-04-27 DIAGNOSIS — Z12.31 ENCOUNTER FOR SCREENING MAMMOGRAM FOR BREAST CANCER: ICD-10-CM

## 2019-04-27 PROCEDURE — 77063 BREAST TOMOSYNTHESIS BI: CPT | Mod: 26,HCNC,, | Performed by: RADIOLOGY

## 2019-04-27 PROCEDURE — 77067 MAMMO DIGITAL SCREENING BILAT WITH TOMOSYNTHESIS_CAD: ICD-10-PCS | Mod: 26,HCNC,, | Performed by: RADIOLOGY

## 2019-04-27 PROCEDURE — 77067 SCR MAMMO BI INCL CAD: CPT | Mod: 26,HCNC,, | Performed by: RADIOLOGY

## 2019-04-27 PROCEDURE — 77067 SCR MAMMO BI INCL CAD: CPT | Mod: TC,HCNC,PO

## 2019-04-27 PROCEDURE — 77063 MAMMO DIGITAL SCREENING BILAT WITH TOMOSYNTHESIS_CAD: ICD-10-PCS | Mod: 26,HCNC,, | Performed by: RADIOLOGY

## 2019-04-29 ENCOUNTER — TELEPHONE (OUTPATIENT)
Dept: FAMILY MEDICINE | Facility: CLINIC | Age: 44
End: 2019-04-29

## 2019-05-14 ENCOUNTER — PES CALL (OUTPATIENT)
Dept: ADMINISTRATIVE | Facility: CLINIC | Age: 44
End: 2019-05-14

## 2019-06-19 ENCOUNTER — OFFICE VISIT (OUTPATIENT)
Dept: FAMILY MEDICINE | Facility: CLINIC | Age: 44
End: 2019-06-19
Payer: MEDICARE

## 2019-06-19 VITALS
BODY MASS INDEX: 30 KG/M2 | OXYGEN SATURATION: 95 % | HEIGHT: 55 IN | SYSTOLIC BLOOD PRESSURE: 104 MMHG | WEIGHT: 129.63 LBS | HEART RATE: 80 BPM | DIASTOLIC BLOOD PRESSURE: 70 MMHG

## 2019-06-19 DIAGNOSIS — E03.4 HYPOTHYROIDISM DUE TO ACQUIRED ATROPHY OF THYROID: ICD-10-CM

## 2019-06-19 DIAGNOSIS — E66.01 SEVERE OBESITY (BMI 35.0-39.9) WITH COMORBIDITY: ICD-10-CM

## 2019-06-19 DIAGNOSIS — E78.00 HYPERCHOLESTEREMIA: Chronic | ICD-10-CM

## 2019-06-19 DIAGNOSIS — K21.9 GERD WITHOUT ESOPHAGITIS: Chronic | ICD-10-CM

## 2019-06-19 DIAGNOSIS — Q90.9 DOWN SYNDROME: ICD-10-CM

## 2019-06-19 DIAGNOSIS — Z00.00 ENCOUNTER FOR PREVENTIVE HEALTH EXAMINATION: Primary | ICD-10-CM

## 2019-06-19 PROCEDURE — 3074F SYST BP LT 130 MM HG: CPT | Mod: HCNC,CPTII,S$GLB, | Performed by: NURSE PRACTITIONER

## 2019-06-19 PROCEDURE — G0439 PR MEDICARE ANNUAL WELLNESS SUBSEQUENT VISIT: ICD-10-PCS | Mod: HCNC,S$GLB,, | Performed by: NURSE PRACTITIONER

## 2019-06-19 PROCEDURE — G0439 PPPS, SUBSEQ VISIT: HCPCS | Mod: HCNC,S$GLB,, | Performed by: NURSE PRACTITIONER

## 2019-06-19 PROCEDURE — 99999 PR PBB SHADOW E&M-EST. PATIENT-LVL IV: CPT | Mod: PBBFAC,HCNC,, | Performed by: NURSE PRACTITIONER

## 2019-06-19 PROCEDURE — 3078F DIAST BP <80 MM HG: CPT | Mod: HCNC,CPTII,S$GLB, | Performed by: NURSE PRACTITIONER

## 2019-06-19 PROCEDURE — 99999 PR PBB SHADOW E&M-EST. PATIENT-LVL IV: ICD-10-PCS | Mod: PBBFAC,HCNC,, | Performed by: NURSE PRACTITIONER

## 2019-06-19 PROCEDURE — 3078F PR MOST RECENT DIASTOLIC BLOOD PRESSURE < 80 MM HG: ICD-10-PCS | Mod: HCNC,CPTII,S$GLB, | Performed by: NURSE PRACTITIONER

## 2019-06-19 PROCEDURE — 3074F PR MOST RECENT SYSTOLIC BLOOD PRESSURE < 130 MM HG: ICD-10-PCS | Mod: HCNC,CPTII,S$GLB, | Performed by: NURSE PRACTITIONER

## 2019-06-19 RX ORDER — LEVOTHYROXINE SODIUM 25 UG/1
25 TABLET ORAL DAILY
Qty: 90 TABLET | Refills: 3 | Status: SHIPPED | OUTPATIENT
Start: 2019-06-19 | End: 2020-06-11

## 2019-06-19 NOTE — PROGRESS NOTES
"Ira Cooley presented for a  Medicare AWV and comprehensive Health Risk Assessment today. The following components were reviewed and updated:    · Medical history  · Family History  · Social history  · Allergies and Current Medications  · Health Risk Assessment  · Health Maintenance  · Care Team     ** See Completed Assessments for Annual Wellness Visit within the encounter summary.**     The following assessments were completed:  · Living Situation  · CAGE  · Depression Screening  · Timed Get Up and Go  · Whisper Test  · Cognitive Function Screening- N/A  · Nutrition Screening  · ADL Screening  · PAQ Screening    Vitals:    06/19/19 1346   BP: 104/70   BP Location: Left arm   Patient Position: Sitting   BP Method: Medium (Manual)   Pulse: 80   SpO2: 95%   Weight: 58.8 kg (129 lb 10.1 oz)   Height: 4' 2" (1.27 m)     Body mass index is 36.46 kg/m².  Physical Exam   Constitutional: She appears well-nourished.   Cardiovascular: Normal rate, regular rhythm, normal heart sounds and intact distal pulses.   Pulmonary/Chest: Effort normal and breath sounds normal.   Neurological: She is alert.   Skin: Skin is warm and dry.   Vitals reviewed.      Diagnoses and health risks identified today and associated recommendations/orders:    1. Encounter for preventive health examination  Reviewed and discussed health maintenance.      2. Hypothyroidism due to acquired atrophy of thyroid  Stable- continue current treatment and follow up routinely with PCP   Labs reviewed 4/2019  - levothyroxine (SYNTHROID) 25 MCG tablet; Take 1 tablet (25 mcg total) by mouth once daily.  Dispense: 90 tablet; Refill: 3    3. Hypercholesteremia  Stable- continue current treatment and follow up routinely with PCP     4. GERD without esophagitis  Stable- continue current treatment and follow up routinely with PCP     5. Down syndrome  Stable- continue current treatment and follow up routinely with PCP     6. Severe obesity (BMI 35.0-39.9) with " comorbidity  Encouraged healthy eating, weight loss and routine exercise     Provided Ira with a 5-10 year written screening schedule and personal prevention plan. Recommendations were developed using the USPSTF age appropriate recommendations. Education, counseling, and referrals were provided as needed. After Visit Summary printed and given to patient which includes a list of additional screenings\tests needed.    Sammi Osuna, NP

## 2019-06-19 NOTE — PATIENT INSTRUCTIONS
Counseling and Referral of Other Preventative  (Italic type indicates deductible and co-insurance are waived)    Patient Name: Ira Cooley  Today's Date: 6/19/2019    Health Maintenance       Date Due Completion Date    Influenza Vaccine 08/01/2019 3/26/2018    Mammogram 04/27/2020 4/27/2019    Pap Smear with HPV Cotest 07/11/2021 7/11/2018 (Declined)    Override on 7/11/2018: Declined    Override on 8/13/2015: Declined    Lipid Panel 04/27/2024 4/27/2019    TETANUS VACCINE 07/11/2024 7/11/2014        No orders of the defined types were placed in this encounter.    The following information is provided to all patients.  This information is to help you find resources for any of the problems found today that may be affecting your health:                Living healthy guide: www.[facility].[place].gov      Understanding Diabetes: www.diabetes.org      Eating healthy: www.cdc.gov/healthyweight      CDC home safety checklist: www.cdc.gov/steadi/patient.html      Agency on Aging: www.goea.[place].gov      Alcoholics anonymous (AA): www.aa.org      Physical Activity: www.justin.nih.gov/pf3buav      Tobacco use: www.quitwithusla.org

## 2019-08-30 ENCOUNTER — PATIENT OUTREACH (OUTPATIENT)
Dept: ADMINISTRATIVE | Facility: HOSPITAL | Age: 44
End: 2019-08-30

## 2019-09-14 ENCOUNTER — PATIENT OUTREACH (OUTPATIENT)
Dept: ADMINISTRATIVE | Facility: HOSPITAL | Age: 44
End: 2019-09-14

## 2019-09-19 RX ORDER — PRAVASTATIN SODIUM 40 MG/1
TABLET ORAL
Qty: 90 TABLET | Refills: 3 | Status: SHIPPED | OUTPATIENT
Start: 2019-09-19 | End: 2020-09-25

## 2020-03-18 ENCOUNTER — PES CALL (OUTPATIENT)
Dept: ADMINISTRATIVE | Facility: CLINIC | Age: 45
End: 2020-03-18

## 2020-05-21 ENCOUNTER — PATIENT OUTREACH (OUTPATIENT)
Dept: ADMINISTRATIVE | Facility: HOSPITAL | Age: 45
End: 2020-05-21

## 2020-05-21 NOTE — PROGRESS NOTES
Non-compliant report chart audits. Chart review completed for HM test overdue (mammograms, Colonoscopies, pap smears, DM labs, and/or EYE EXAMs)  05/21/2020    Care Everywhere and media, updates requested and reviewed.        [facility] and [facility] reviewed.  Pap Smear and  LABS       DIS reviewed for test needed.  Mammogram

## 2020-06-05 ENCOUNTER — TELEPHONE (OUTPATIENT)
Dept: FAMILY MEDICINE | Facility: CLINIC | Age: 45
End: 2020-06-05

## 2020-06-05 DIAGNOSIS — E03.4 HYPOTHYROIDISM DUE TO ACQUIRED ATROPHY OF THYROID: ICD-10-CM

## 2020-06-05 DIAGNOSIS — E78.5 DYSLIPIDEMIA: Primary | ICD-10-CM

## 2020-06-05 DIAGNOSIS — Z79.899 ENCOUNTER FOR LONG-TERM (CURRENT) USE OF MEDICATIONS: ICD-10-CM

## 2020-06-10 ENCOUNTER — PATIENT OUTREACH (OUTPATIENT)
Dept: ADMINISTRATIVE | Facility: HOSPITAL | Age: 45
End: 2020-06-10

## 2020-06-10 DIAGNOSIS — Z12.31 ENCOUNTER FOR SCREENING MAMMOGRAM FOR BREAST CANCER: Primary | ICD-10-CM

## 2020-06-10 NOTE — PROGRESS NOTES
Chart review completed 06/10/2020  Care Everywhere updates requested and reviewed.  Immunizations reconciled. Media reviewed.     WOG orders placed

## 2020-06-10 NOTE — LETTER
Earnestine 10, 2020    Ira Cooley  [address]  [place] [facility] [place] [address]             [facility]  [address]  [place] [address]  Phone: [phone] Dear Ms. Cooley:    [facility] is committed to your overall health.  To help you get the most out of each of your visits, we will review your information to make sure you are up to date on all of your recommended tests and/or procedures.      Dr. Sebastien Pretty MD has found that your chart shows you may be due for     Health Maintenance Due   Topic    Mammogram        Future Appointments   Date Time Provider Department Center   6/20/2020 10:25 AM LAB, [facility] [place]   6/24/2020  2:30 PM Sebastien Pretty MD [facility]   7/2/2020  2:00 PM ANNUAL WELLNESS VISIT-NURSE PRACTITIONER, [place] [facility]     If you have had any of the above done at another facility, please bring the records or information with you so that your record at [facility] will be complete.  If you would like to schedule any of these, please contact me.    If you are currently taking medication, please bring it with you to your appointment for review.    Also, if you have any type of Advanced Directives, please bring them with you to your office visit so we may scan them into your chart.    MD Dino Leonard LPN Clinical Care Coordinator  [facility]  [address] [facility] [address]  [place] [address]  P: [phone]  F: [phone]

## 2020-06-20 ENCOUNTER — LAB VISIT (OUTPATIENT)
Dept: LAB | Facility: HOSPITAL | Age: 45
End: 2020-06-20
Attending: INTERNAL MEDICINE
Payer: MEDICARE

## 2020-06-20 DIAGNOSIS — Z79.899 ENCOUNTER FOR LONG-TERM (CURRENT) USE OF MEDICATIONS: ICD-10-CM

## 2020-06-20 DIAGNOSIS — E78.5 DYSLIPIDEMIA: ICD-10-CM

## 2020-06-20 DIAGNOSIS — E03.4 HYPOTHYROIDISM DUE TO ACQUIRED ATROPHY OF THYROID: ICD-10-CM

## 2020-06-20 LAB
ALBUMIN SERPL BCP-MCNC: 3.8 G/DL (ref 3.5–5.2)
ALP SERPL-CCNC: 84 U/L (ref 55–135)
ALT SERPL W/O P-5'-P-CCNC: 21 U/L (ref 10–44)
ANION GAP SERPL CALC-SCNC: 9 MMOL/L (ref 8–16)
AST SERPL-CCNC: 27 U/L (ref 10–40)
BASOPHILS # BLD AUTO: 0.08 K/UL (ref 0–0.2)
BASOPHILS NFR BLD: 1.5 % (ref 0–1.9)
BILIRUB SERPL-MCNC: 0.3 MG/DL (ref 0.1–1)
BUN SERPL-MCNC: 17 MG/DL (ref 6–20)
CALCIUM SERPL-MCNC: 9.5 MG/DL (ref 8.7–10.5)
CHLORIDE SERPL-SCNC: 106 MMOL/L (ref 95–110)
CHOLEST SERPL-MCNC: 226 MG/DL (ref 120–199)
CHOLEST/HDLC SERPL: 4.2 {RATIO} (ref 2–5)
CO2 SERPL-SCNC: 26 MMOL/L (ref 23–29)
CREAT SERPL-MCNC: 0.8 MG/DL (ref 0.5–1.4)
DIFFERENTIAL METHOD: ABNORMAL
EOSINOPHIL # BLD AUTO: 0.1 K/UL (ref 0–0.5)
EOSINOPHIL NFR BLD: 2.1 % (ref 0–8)
ERYTHROCYTE [DISTWIDTH] IN BLOOD BY AUTOMATED COUNT: 14.5 % (ref 11.5–14.5)
EST. GFR  (AFRICAN AMERICAN): >60 ML/MIN/1.73 M^2
EST. GFR  (NON AFRICAN AMERICAN): >60 ML/MIN/1.73 M^2
GLUCOSE SERPL-MCNC: 91 MG/DL (ref 70–110)
HCT VFR BLD AUTO: 44.1 % (ref 37–48.5)
HDLC SERPL-MCNC: 54 MG/DL (ref 40–75)
HDLC SERPL: 23.9 % (ref 20–50)
HGB BLD-MCNC: 13.9 G/DL (ref 12–16)
IMM GRANULOCYTES # BLD AUTO: 0.02 K/UL (ref 0–0.04)
IMM GRANULOCYTES NFR BLD AUTO: 0.4 % (ref 0–0.5)
LDLC SERPL CALC-MCNC: 119.8 MG/DL (ref 63–159)
LYMPHOCYTES # BLD AUTO: 1.7 K/UL (ref 1–4.8)
LYMPHOCYTES NFR BLD: 31 % (ref 18–48)
MCH RBC QN AUTO: 33.4 PG (ref 27–31)
MCHC RBC AUTO-ENTMCNC: 31.5 G/DL (ref 32–36)
MCV RBC AUTO: 106 FL (ref 82–98)
MONOCYTES # BLD AUTO: 0.5 K/UL (ref 0.3–1)
MONOCYTES NFR BLD: 9.2 % (ref 4–15)
NEUTROPHILS # BLD AUTO: 3 K/UL (ref 1.8–7.7)
NEUTROPHILS NFR BLD: 55.8 % (ref 38–73)
NONHDLC SERPL-MCNC: 172 MG/DL
NRBC BLD-RTO: 0 /100 WBC
PLATELET # BLD AUTO: 275 K/UL (ref 150–350)
PMV BLD AUTO: 10.1 FL (ref 9.2–12.9)
POTASSIUM SERPL-SCNC: 4.5 MMOL/L (ref 3.5–5.1)
PROT SERPL-MCNC: 7.4 G/DL (ref 6–8.4)
RBC # BLD AUTO: 4.16 M/UL (ref 4–5.4)
SODIUM SERPL-SCNC: 141 MMOL/L (ref 136–145)
TRIGL SERPL-MCNC: 261 MG/DL (ref 30–150)
TSH SERPL DL<=0.005 MIU/L-ACNC: 3.68 UIU/ML (ref 0.4–4)
WBC # BLD AUTO: 5.33 K/UL (ref 3.9–12.7)

## 2020-06-20 PROCEDURE — 36415 COLL VENOUS BLD VENIPUNCTURE: CPT | Mod: HCNC,PO

## 2020-06-20 PROCEDURE — 80061 LIPID PANEL: CPT | Mod: HCNC

## 2020-06-20 PROCEDURE — 84443 ASSAY THYROID STIM HORMONE: CPT | Mod: HCNC

## 2020-06-20 PROCEDURE — 80053 COMPREHEN METABOLIC PANEL: CPT | Mod: HCNC

## 2020-06-20 PROCEDURE — 85025 COMPLETE CBC W/AUTO DIFF WBC: CPT | Mod: HCNC

## 2020-06-24 ENCOUNTER — OFFICE VISIT (OUTPATIENT)
Dept: FAMILY MEDICINE | Facility: CLINIC | Age: 45
End: 2020-06-24
Payer: MEDICARE

## 2020-06-24 VITALS
HEART RATE: 79 BPM | WEIGHT: 139.13 LBS | BODY MASS INDEX: 32.2 KG/M2 | DIASTOLIC BLOOD PRESSURE: 72 MMHG | SYSTOLIC BLOOD PRESSURE: 112 MMHG | HEIGHT: 55 IN | TEMPERATURE: 98 F | OXYGEN SATURATION: 96 %

## 2020-06-24 DIAGNOSIS — Z79.899 ENCOUNTER FOR LONG-TERM (CURRENT) USE OF MEDICATIONS: ICD-10-CM

## 2020-06-24 DIAGNOSIS — Z00.00 ROUTINE PHYSICAL EXAMINATION: Primary | ICD-10-CM

## 2020-06-24 DIAGNOSIS — E03.4 HYPOTHYROIDISM DUE TO ACQUIRED ATROPHY OF THYROID: ICD-10-CM

## 2020-06-24 DIAGNOSIS — E78.5 DYSLIPIDEMIA: ICD-10-CM

## 2020-06-24 PROCEDURE — 3078F DIAST BP <80 MM HG: CPT | Mod: HCNC,CPTII,S$GLB, | Performed by: INTERNAL MEDICINE

## 2020-06-24 PROCEDURE — 99396 PR PREVENTIVE VISIT,EST,40-64: ICD-10-PCS | Mod: HCNC,S$GLB,, | Performed by: INTERNAL MEDICINE

## 2020-06-24 PROCEDURE — 3074F SYST BP LT 130 MM HG: CPT | Mod: HCNC,CPTII,S$GLB, | Performed by: INTERNAL MEDICINE

## 2020-06-24 PROCEDURE — 99999 PR PBB SHADOW E&M-EST. PATIENT-LVL III: CPT | Mod: PBBFAC,HCNC,, | Performed by: INTERNAL MEDICINE

## 2020-06-24 PROCEDURE — 99999 PR PBB SHADOW E&M-EST. PATIENT-LVL III: ICD-10-PCS | Mod: PBBFAC,HCNC,, | Performed by: INTERNAL MEDICINE

## 2020-06-24 PROCEDURE — 3078F PR MOST RECENT DIASTOLIC BLOOD PRESSURE < 80 MM HG: ICD-10-PCS | Mod: HCNC,CPTII,S$GLB, | Performed by: INTERNAL MEDICINE

## 2020-06-24 PROCEDURE — 3074F PR MOST RECENT SYSTOLIC BLOOD PRESSURE < 130 MM HG: ICD-10-PCS | Mod: HCNC,CPTII,S$GLB, | Performed by: INTERNAL MEDICINE

## 2020-06-24 PROCEDURE — 99396 PREV VISIT EST AGE 40-64: CPT | Mod: HCNC,S$GLB,, | Performed by: INTERNAL MEDICINE

## 2020-06-24 PROCEDURE — 99499 UNLISTED E&M SERVICE: CPT | Mod: HCNC,S$GLB,, | Performed by: INTERNAL MEDICINE

## 2020-06-24 PROCEDURE — 99499 RISK ADDL DX/OHS AUDIT: ICD-10-PCS | Mod: HCNC,S$GLB,, | Performed by: INTERNAL MEDICINE

## 2020-06-24 NOTE — PROGRESS NOTES
Subjective:       Patient ID: Ira Cooley is a 44 y.o. female.    Chief Complaint: Annual Exam    Here for routine health maintenance.    Down syndrome - lives with her mother who cares for her.  Exercising 4-5x per week with mother.    Obesity - eats poorly   Hypothryoid - was mild but was having hair loss at dx.  Controlled  HLD - controlled LDL; mildly high triglycerides    Review of Systems   Constitutional: Negative for appetite change and fever.   HENT: Negative for nosebleeds and trouble swallowing.    Eyes: Negative for discharge and visual disturbance.   Respiratory: Negative for choking and shortness of breath.    Cardiovascular: Negative for chest pain and palpitations.   Gastrointestinal: Negative for abdominal pain, nausea and vomiting.   Musculoskeletal: Negative for arthralgias and joint swelling.   Skin: Negative for rash and wound.   Neurological: Negative for dizziness and syncope.   Psychiatric/Behavioral: Negative for confusion and dysphoric mood.       Objective:      Vitals:    06/24/20 1433   BP: 112/72   Pulse: 79   Temp: 97.6 °F (36.4 °C)     Physical Exam  Vitals signs reviewed.   Eyes:      Conjunctiva/sclera: Conjunctivae normal.   Neck:      Musculoskeletal: Normal range of motion.      Thyroid: No thyromegaly.      Trachea: Trachea normal.   Cardiovascular:      Heart sounds: Normal heart sounds.      Comments: Edema negative  Pulmonary:      Effort: Pulmonary effort is normal.      Breath sounds: Normal breath sounds.   Abdominal:      Palpations: Abdomen is soft. There is no hepatomegaly.   Skin:     General: Skin is warm and dry.   Neurological:      Cranial Nerves: No cranial nerve deficit.      Comments: DTR decreased bilateral   Psychiatric:      Comments: Alert and Oriented            Assessment:       1. Routine physical examination    2. Dyslipidemia    3. Hypothyroidism due to acquired atrophy of thyroid    4. Encounter for long-term (current) use of medications         Plan:       Routine physical examination    Dyslipidemia  -     Comprehensive metabolic panel; Future; Expected date: 06/24/2020  -     Lipid Panel; Future; Expected date: 06/24/2020    Hypothyroidism due to acquired atrophy of thyroid  -     TSH; Future; Expected date: 06/24/2020    Encounter for long-term (current) use of medications  -     CBC auto differential; Future; Expected date: 06/24/2020            Medication List with Changes/Refills   Current Medications    BIOTIN ORAL    Take 1 tablet by mouth once daily.      ERGOCALCIFEROL, VITAMIN D2, (VITAMIN D2 ORAL)    Take by mouth once daily.    LEVOTHYROXINE (SYNTHROID) 25 MCG TABLET    TAKE 1 TABLET EVERY DAY    MULTIVIT WITH CALCIUM,IRON,MIN (MULTIPLE VITAMIN, WOMENS ORAL)    Take by mouth.      PRAVASTATIN (PRAVACHOL) 40 MG TABLET    TAKE 1 TABLET EVERY DAY    VITAMIN E 100 UNIT CAPSULE    Take 100 Units by mouth once daily.       Wellness reviewed   Continue current management and monitor.    Counseled on regular exercise, maintenance of a healthy weight, balanced diet rich in fruits/vegetables and lean protein, and avoidance of unhealthy habits like smoking and excessive alcohol intake.   Also, counseled on importance of being compliant with medication, health appointments, diet and exercise.     Follow up in about 1 year (around 6/24/2021).

## 2020-06-29 NOTE — PROGRESS NOTES
Patient, Ira Cooley (MRN #[id number]), presented with a recorded BMI of 39.12 kg/m^2 and a documented comorbidity(s):  - Hyperlipidemia  to which the severe obesity is a contributing factor. This is consistent with the definition of severe obesity (BMI 35.0-39.9) with comorbidity (ICD-10 E66.01, Z68.35). The patient's severe obesity was monitored, evaluated, addressed and/or treated. This addendum to the medical record is made on 06/29/2020.

## 2020-07-02 ENCOUNTER — OFFICE VISIT (OUTPATIENT)
Dept: FAMILY MEDICINE | Facility: CLINIC | Age: 45
End: 2020-07-02
Payer: MEDICARE

## 2020-07-02 VITALS
SYSTOLIC BLOOD PRESSURE: 114 MMHG | DIASTOLIC BLOOD PRESSURE: 66 MMHG | WEIGHT: 138 LBS | BODY MASS INDEX: 31.94 KG/M2 | OXYGEN SATURATION: 95 % | HEIGHT: 55 IN | HEART RATE: 74 BPM

## 2020-07-02 DIAGNOSIS — E78.00 HYPERCHOLESTEREMIA: Chronic | ICD-10-CM

## 2020-07-02 DIAGNOSIS — Z00.00 ENCOUNTER FOR PREVENTIVE HEALTH EXAMINATION: Primary | ICD-10-CM

## 2020-07-02 DIAGNOSIS — E66.01 SEVERE OBESITY (BMI 35.0-39.9) WITH COMORBIDITY: ICD-10-CM

## 2020-07-02 DIAGNOSIS — E03.4 HYPOTHYROIDISM DUE TO ACQUIRED ATROPHY OF THYROID: ICD-10-CM

## 2020-07-02 DIAGNOSIS — Q90.9 DOWN SYNDROME: ICD-10-CM

## 2020-07-02 PROCEDURE — 3074F SYST BP LT 130 MM HG: CPT | Mod: HCNC,CPTII,S$GLB, | Performed by: NURSE PRACTITIONER

## 2020-07-02 PROCEDURE — 3074F PR MOST RECENT SYSTOLIC BLOOD PRESSURE < 130 MM HG: ICD-10-PCS | Mod: HCNC,CPTII,S$GLB, | Performed by: NURSE PRACTITIONER

## 2020-07-02 PROCEDURE — 99999 PR PBB SHADOW E&M-EST. PATIENT-LVL IV: ICD-10-PCS | Mod: PBBFAC,HCNC,, | Performed by: NURSE PRACTITIONER

## 2020-07-02 PROCEDURE — 99499 UNLISTED E&M SERVICE: CPT | Mod: S$GLB,,, | Performed by: NURSE PRACTITIONER

## 2020-07-02 PROCEDURE — 99499 RISK ADDL DX/OHS AUDIT: ICD-10-PCS | Mod: S$GLB,,, | Performed by: NURSE PRACTITIONER

## 2020-07-02 PROCEDURE — G0439 PR MEDICARE ANNUAL WELLNESS SUBSEQUENT VISIT: ICD-10-PCS | Mod: HCNC,S$GLB,, | Performed by: NURSE PRACTITIONER

## 2020-07-02 PROCEDURE — 3078F DIAST BP <80 MM HG: CPT | Mod: HCNC,CPTII,S$GLB, | Performed by: NURSE PRACTITIONER

## 2020-07-02 PROCEDURE — G0439 PPPS, SUBSEQ VISIT: HCPCS | Mod: HCNC,S$GLB,, | Performed by: NURSE PRACTITIONER

## 2020-07-02 PROCEDURE — 99999 PR PBB SHADOW E&M-EST. PATIENT-LVL IV: CPT | Mod: PBBFAC,HCNC,, | Performed by: NURSE PRACTITIONER

## 2020-07-02 PROCEDURE — 3078F PR MOST RECENT DIASTOLIC BLOOD PRESSURE < 80 MM HG: ICD-10-PCS | Mod: HCNC,CPTII,S$GLB, | Performed by: NURSE PRACTITIONER

## 2020-07-02 NOTE — PATIENT INSTRUCTIONS
Counseling and Referral of Other Preventative  (Italic type indicates deductible and co-insurance are waived)    Patient Name: Ira Cooley  Today's Date: 7/2/2020    Health Maintenance       Date Due Completion Date    Mammogram 04/27/2020 4/27/2019    HIV Screening 06/24/2026 (Originally 11/19/1990) ---    Influenza Vaccine (1) 09/01/2020 3/26/2018    Cervical Cancer Screening 07/11/2021 7/11/2018 (Declined)    Override on 7/11/2018: Declined    Override on 8/13/2015: Declined    TETANUS VACCINE 07/11/2024 7/11/2014    Lipid Panel 06/20/2025 6/20/2020        No orders of the defined types were placed in this encounter.    The following information is provided to all patients.  This information is to help you find resources for any of the problems found today that may be affecting your health:                Living healthy guide: www.[facility].[place].[facility]      Understanding Diabetes: www.diabetes.org      Eating healthy: www.cdc.gov/healthyweight      [facility] home safety checklist: www.cdc.gov/steadi/patient.html      Agency on Aging: www.goea.[place].[facility]      Alcoholics anonymous (AA): www.aa.org      Physical Activity: www.justin.nih.gov/wl8euty      Tobacco use: www.quitwithusla.org

## 2020-07-02 NOTE — PROGRESS NOTES
"  Ira Cooley presented for a  Medicare AWV and comprehensive Health Risk Assessment today. The following components were reviewed and updated:    · Medical history  · Family History  · Social history  · Allergies and Current Medications  · Health Risk Assessment  · Health Maintenance  · Care Team     ** See Completed Assessments for Annual Wellness Visit within the encounter summary.**     The following assessments were completed:  · Living Situation  · CAGE  · Depression Screening  · Timed Get Up and Go  · Whisper Test  · Cognitive Function Screening-N/A  · Nutrition Screening  · ADL Screening  · PAQ Screening    Vitals:    07/02/20 1238   BP: 114/66   BP Location: Left arm   Patient Position: Sitting   BP Method: Medium (Manual)   Pulse: 74   SpO2: 95%   Weight: 62.6 kg (138 lb 0.1 oz)   Height: 4' 2" (1.27 m)     Body mass index is 38.81 kg/m².  Physical Exam  Vitals signs reviewed.   Constitutional:       Appearance: Normal appearance.   Cardiovascular:      Rate and Rhythm: Normal rate and regular rhythm.   Pulmonary:      Effort: Pulmonary effort is normal.      Breath sounds: Normal breath sounds.   Skin:     General: Skin is warm and dry.   Neurological:      Mental Status: She is oriented to person, place, and time.         Diagnoses and health risks identified today and associated recommendations/orders:    1. Encounter for preventive health examination  Reviewed and discussed health maintenance.      2. Severe obesity (BMI 35.0-39.9) with comorbidity  Encouraged healthy eating, weight loss and routine exercise     3. Hypercholesteremia  Stable- continue current treatment and follow up routinely with PCP     4. Hypothyroidism due to acquired atrophy of thyroid  Stable- continue current treatment and follow up routinely with PCP     5. Down syndrome  Stable- continue current treatment and follow up routinely with PCP     Provided Ira with a 5-10 year written screening schedule and personal prevention " plan. Recommendations were developed using the USPSTF age appropriate recommendations. Education, counseling, and referrals were provided as needed. After Visit Summary printed and given to patient which includes a list of additional screenings\tests needed.    Sammi Osuna NP

## 2020-08-03 ENCOUNTER — PATIENT OUTREACH (OUTPATIENT)
Dept: ADMINISTRATIVE | Facility: OTHER | Age: 45
End: 2020-08-03

## 2020-08-03 NOTE — PROGRESS NOTES
Requested updates within Care Everywhere.  Patient's chart was reviewed for overdue MICHAEL topics.  Immunizations reconciled.

## 2020-08-05 ENCOUNTER — OFFICE VISIT (OUTPATIENT)
Dept: OBSTETRICS AND GYNECOLOGY | Facility: CLINIC | Age: 45
End: 2020-08-05
Payer: MEDICARE

## 2020-08-05 ENCOUNTER — HOSPITAL ENCOUNTER (OUTPATIENT)
Dept: RADIOLOGY | Facility: HOSPITAL | Age: 45
Discharge: HOME | End: 2020-08-05
Attending: OBSTETRICS & GYNECOLOGY
Payer: MEDICARE

## 2020-08-05 VITALS
WEIGHT: 139.75 LBS | HEIGHT: 55 IN | DIASTOLIC BLOOD PRESSURE: 76 MMHG | BODY MASS INDEX: 32.34 KG/M2 | SYSTOLIC BLOOD PRESSURE: 114 MMHG

## 2020-08-05 DIAGNOSIS — Q90.9 DOWN SYNDROME: ICD-10-CM

## 2020-08-05 DIAGNOSIS — Z12.31 ENCOUNTER FOR SCREENING MAMMOGRAM FOR MALIGNANT NEOPLASM OF BREAST: ICD-10-CM

## 2020-08-05 DIAGNOSIS — E78.00 HYPERCHOLESTEREMIA: ICD-10-CM

## 2020-08-05 DIAGNOSIS — E03.4 HYPOTHYROIDISM DUE TO ACQUIRED ATROPHY OF THYROID: ICD-10-CM

## 2020-08-05 DIAGNOSIS — E66.01 SEVERE OBESITY (BMI 35.0-39.9) WITH COMORBIDITY: ICD-10-CM

## 2020-08-05 DIAGNOSIS — Z01.419 GYNECOLOGIC EXAM NORMAL: ICD-10-CM

## 2020-08-05 DIAGNOSIS — Z01.419 GYNECOLOGIC EXAM NORMAL: Primary | ICD-10-CM

## 2020-08-05 DIAGNOSIS — N95.0 POSTMENOPAUSAL BLEEDING: ICD-10-CM

## 2020-08-05 PROCEDURE — 3074F SYST BP LT 130 MM HG: CPT | Mod: HCNC,CPTII,S$GLB, | Performed by: OBSTETRICS & GYNECOLOGY

## 2020-08-05 PROCEDURE — 99999 PR PBB SHADOW E&M-EST. PATIENT-LVL III: CPT | Mod: PBBFAC,HCNC,, | Performed by: OBSTETRICS & GYNECOLOGY

## 2020-08-05 PROCEDURE — 77067 MAMMO DIGITAL SCREENING BILAT WITH TOMOSYNTHESIS_CAD: ICD-10-PCS | Mod: 26,HCNC,, | Performed by: RADIOLOGY

## 2020-08-05 PROCEDURE — 77067 SCR MAMMO BI INCL CAD: CPT | Mod: TC,HCNC,PN

## 2020-08-05 PROCEDURE — 3078F PR MOST RECENT DIASTOLIC BLOOD PRESSURE < 80 MM HG: ICD-10-PCS | Mod: HCNC,CPTII,S$GLB, | Performed by: OBSTETRICS & GYNECOLOGY

## 2020-08-05 PROCEDURE — 3008F BODY MASS INDEX DOCD: CPT | Mod: HCNC,CPTII,S$GLB, | Performed by: OBSTETRICS & GYNECOLOGY

## 2020-08-05 PROCEDURE — 3074F PR MOST RECENT SYSTOLIC BLOOD PRESSURE < 130 MM HG: ICD-10-PCS | Mod: HCNC,CPTII,S$GLB, | Performed by: OBSTETRICS & GYNECOLOGY

## 2020-08-05 PROCEDURE — 77063 MAMMO DIGITAL SCREENING BILAT WITH TOMOSYNTHESIS_CAD: ICD-10-PCS | Mod: 26,HCNC,, | Performed by: RADIOLOGY

## 2020-08-05 PROCEDURE — 99999 PR PBB SHADOW E&M-EST. PATIENT-LVL III: ICD-10-PCS | Mod: PBBFAC,HCNC,, | Performed by: OBSTETRICS & GYNECOLOGY

## 2020-08-05 PROCEDURE — 3008F PR BODY MASS INDEX (BMI) DOCUMENTED: ICD-10-PCS | Mod: HCNC,CPTII,S$GLB, | Performed by: OBSTETRICS & GYNECOLOGY

## 2020-08-05 PROCEDURE — G0101 PR CA SCREEN;PELVIC/BREAST EXAM: ICD-10-PCS | Mod: HCNC,S$GLB,, | Performed by: OBSTETRICS & GYNECOLOGY

## 2020-08-05 PROCEDURE — G0101 CA SCREEN;PELVIC/BREAST EXAM: HCPCS | Mod: HCNC,S$GLB,, | Performed by: OBSTETRICS & GYNECOLOGY

## 2020-08-05 PROCEDURE — 77063 BREAST TOMOSYNTHESIS BI: CPT | Mod: 26,HCNC,, | Performed by: RADIOLOGY

## 2020-08-05 PROCEDURE — 77067 SCR MAMMO BI INCL CAD: CPT | Mod: 26,HCNC,, | Performed by: RADIOLOGY

## 2020-08-05 PROCEDURE — 3078F DIAST BP <80 MM HG: CPT | Mod: HCNC,CPTII,S$GLB, | Performed by: OBSTETRICS & GYNECOLOGY

## 2020-08-05 NOTE — PROGRESS NOTES
Chief Complaint   Patient presents with    [facility]    Well Woman    Mammogram       History of Present Illness: Ira Cooley is a 44 y.o. female that presents today 8/5/2020 for well gyn visit.    Past Medical History:   Diagnosis Date    GERD (gastroesophageal reflux disease)     Hypercholesteremia     Menopause 2017    Thyroid activity decreased        Past Surgical History:   Procedure Laterality Date    TONSILLECTOMY         Current Outpatient Medications   Medication Sig Dispense Refill    Bifidobacterium infantis (ALIGN ORAL) Take by mouth.      BIOTIN ORAL Take 1 tablet by mouth once daily.        ERGOCALCIFEROL, VITAMIN D2, (VITAMIN D2 ORAL) Take by mouth once daily.      levothyroxine (SYNTHROID) 25 MCG tablet TAKE 1 TABLET EVERY DAY 30 tablet 0    MULTIVIT WITH CALCIUM,IRON,MIN (MULTIPLE VITAMIN, WOMENS ORAL) Take by mouth.        pravastatin (PRAVACHOL) 40 MG tablet TAKE 1 TABLET EVERY DAY 90 tablet 3     No current facility-administered medications for this visit.        Review of patient's allergies indicates:   Allergen Reactions    Adacel (adolescent & adult) [diph,pertuss(acel),tet vac(pf)] Other (See Comments)     Redness, pain, and induration at injection site.       Family History   Problem Relation Age of Onset    Cataracts Maternal Aunt     Diabetes Maternal Aunt     Stroke Maternal Aunt     Thyroid disease Maternal Aunt     Cancer Maternal Aunt     Cancer Maternal Uncle     Cancer Maternal Grandmother     Cancer Maternal Grandfather        Social History     Socioeconomic History    Marital status: Single     Spouse name: Not on file    Number of children: Not on file    Years of education: Not on file    Highest education level: Not on file   Occupational History    Not on file   Social Needs    Financial resource strain: Not on file    Food insecurity     Worry: Not on file     Inability: Not on file    Transportation needs     Medical: Not on file      "Non-medical: Not on file   Tobacco Use    Smoking status: Never Smoker    Smokeless tobacco: Never Used   Substance and Sexual Activity    Alcohol use: No    Drug use: No    Sexual activity: Never   Lifestyle    Physical activity     Days per week: Not on file     Minutes per session: Not on file    Stress: Not on file   Relationships    Social connections     Talks on phone: Not on file     Gets together: Not on file     Attends Religion service: Not on file     Active member of club or organization: Not on file     Attends meetings of clubs or organizations: Not on file     Relationship status: Not on file   Other Topics Concern    Not on file   Social History Narrative    Not on file       OB History    Para Term  AB Living   0 0 0 0 0 0   SAB TAB Ectopic Multiple Live Births   0 0 0 0 0       Review of Symptoms:  GENERAL: Denies weight gain or weight loss. Feeling well overall.   SKIN: Denies rash or lesions.   HEAD: Denies head injury or headache.   NODES: Denies enlarged lymph nodes.   CHEST: Denies chest pain or shortness of breath.   CARDIOVASCULAR: Denies palpitations or left sided chest pain.   ABDOMEN: No abdominal pain, constipation, diarrhea, nausea, vomiting or rectal bleeding.   URINARY: No frequency, dysuria, hematuria, or burning on urination.  HEMATOLOGIC: No easy bruisability or excessive bleeding.   MUSCULOSKELETAL: Denies joint pain or swelling.     /76   Ht 4' 2" (1.27 m)   Wt 63.4 kg (139 lb 12.4 oz)   LMP 2017 (Approximate)   Physical Exam:  APPEARANCE: Well nourished, well developed, in no acute distress.  SKIN: Normal skin turgor, no lesions.  NECK: Neck symmetric without masses   RESPIRATORY: Normal respiratory effort with no retractions or use of accessory muscles  CARDIOVASCULAR: Peripheral vascular system with no swelling no varicosities and palpation of pulses normal  LYMPHATIC: No enlargements of the lymph nodes noted in the neck, axillae, or " groin  ABDOMEN: Soft. No tenderness or masses. No hepatosplenomegaly. No hernias.  BREASTS: Symmetrical, no skin changes or visible lesions. No palpable masses, nipple discharge or adenopathy bilaterally.  PELVIC: refused      EXTREMITIES: No clubbing cyanosis or edema.    ASSESSMENT/PLAN:  Gynecologic exam normal  -     Mammo Digital Screening Bilat w/ Augustus; Future; Expected date: 08/05/2020    Encounter for screening mammogram for malignant neoplasm of breast   -     Mammo Digital Screening Bilat w/ Augustus; Future; Expected date: 08/05/2020    Postmenopausal bleeding  -     US Pelvis Comp with Transvag NON-OB (xpd; Future; Expected date: 08/05/2020    Down syndrome    Severe obesity (BMI 35.0-39.9) with comorbidity    Hypercholesteremia    Hypothyroidism due to acquired atrophy of thyroid          Patient was counseled today on Pelvic exams and Pap Smear guidelines.   We discussed STD screening if at high risk for a STD.  We discussed recommendation for breast cancer screening with mammogram every other year after the age of 40 and annually after the age of 50.    We discussed colon cancer screening when indicated.   Osteoporosis screening discussed when indicated.   She was advised to see her primary care physician for all other health maintenance.     FOLLOW-UP with me for next routine visit.

## 2020-08-07 DIAGNOSIS — E03.4 HYPOTHYROIDISM DUE TO ACQUIRED ATROPHY OF THYROID: ICD-10-CM

## 2020-08-07 NOTE — TELEPHONE ENCOUNTER
No new care gaps identified.  Powered by [employer] Reference number: [id number]. 8/07/2020 12:16:37 PM CDT

## 2020-08-07 NOTE — TELEPHONE ENCOUNTER
----- Message from Britt Franklin sent at 8/7/2020 10:13 AM CDT -----  Regarding: Refill Mail Order  Contact: [phone]  Type:  RX Refill Request    Who Called: Pt  Refill or New Rx:  Refill  RX Name and Strength:  levothyroxine (SYNTHROID) 25 MCG tablet  How is the patient currently taking it? (ex. 1XDay):  ...  Is this a 30 day or 90 day RX:  90   Preferred Pharmacy with phone number:        [id number] - [place], [place] - [address]  [address] [address]  Phone: [phone] Fax: [phone]    Local or Mail Order:  local   Ordering Provider:  Sukhwinder Fregoso Call Back Number:  [phone]    Patient called for refill request 1 week ago and still haven't received medication.

## 2020-08-10 RX ORDER — LEVOTHYROXINE SODIUM 25 UG/1
25 TABLET ORAL DAILY
Qty: 90 TABLET | Refills: 3 | Status: SHIPPED | OUTPATIENT
Start: 2020-08-10 | End: 2021-05-13

## 2020-08-10 NOTE — PROGRESS NOTES
Refill Authorization Note    is requesting a refill authorization.    Brief assessment and rationale for refill: APPROVE: prr          Medication Therapy Plan: CDMR; TSH WNL; Approve 3 more    Medication reconciliation completed: No                         Comments:      Orders Placed This Encounter    levothyroxine (SYNTHROID) 25 MCG tablet      Requested Prescriptions   Signed Prescriptions Disp Refills    levothyroxine (SYNTHROID) 25 MCG tablet 90 tablet 3     Sig: Take 1 tablet (25 mcg total) by mouth once daily.       Endocrinology:  Hypothyroid Agents Failed - 8/7/2020 12:16 PM        Failed - Manual Review: FT4 is not required if last TSH is WNL.        Failed - T4 free within 1080 days     Free T4   Date Value Ref Range Status   01/15/2015 0.91 0.71 - 1.51 ng/dL Final   07/15/2014 0.80 0.71 - 1.51 ng/dL Final   02/07/2012 0.96 0.71 - 1.51 ng/dl Final              Passed - Patient is at least 18 years old        Passed - Negative Pregnancy Status Check        Passed - Office visit in past 12 months or future 90 days.     Recent Outpatient Visits            5 days ago Gynecologic exam normal    [facility] Nichelle Brown MD    1 month ago Encounter for preventive health examination    [facility] Sammi Osuna NP    1 month ago Routine physical examination    [facility] Sebastien Pretty MD    1 year ago Encounter for preventive health examination    [facility] Sammi Osuna NP    1 year ago Routine physical examination    [facility] Sebastien Pretty MD          Future Appointments              In 1 week [facility], [facility] [place]                Passed - TSH in normal range and within 360 days     TSH   Date Value Ref Range Status   06/20/2020 3.680 0.400 - 4.000 uIU/mL Final   04/27/2019 2.786 0.400 - 4.000 uIU/mL Final   04/07/2018 3.579 0.400 - 4.000 uIU/mL Final                   Appointments  past 12m or future 3m with PCP    Date Provider   Last Visit   6/24/2020 Sebastien Pretty MD   Next Visit   Visit date not found Sebastien Pretty MD   ED visits in past 90 days: 0     Note composed:12:16 PM 08/10/2020

## 2020-08-13 ENCOUNTER — TELEPHONE (OUTPATIENT)
Dept: FAMILY MEDICINE | Facility: CLINIC | Age: 45
End: 2020-08-13

## 2020-08-13 DIAGNOSIS — E03.4 HYPOTHYROIDISM DUE TO ACQUIRED ATROPHY OF THYROID: ICD-10-CM

## 2020-08-13 NOTE — TELEPHONE ENCOUNTER
----- Message from Britt Reid sent at 8/13/2020  8:51 AM CDT -----  Regarding: Message  Contact: [phone]  Type: Needs Medical Advice  Who Called:  patient Mom  Best Call Back Number: [phone]  Additional Information: Patient Mom  is requesting a call back in regards to why Rx haven't been sent to Pharmacy. please and thank you.

## 2020-08-17 ENCOUNTER — HOSPITAL ENCOUNTER (OUTPATIENT)
Dept: RADIOLOGY | Facility: HOSPITAL | Age: 45
Discharge: HOME | End: 2020-08-17
Attending: OBSTETRICS & GYNECOLOGY
Payer: MEDICARE

## 2020-08-17 DIAGNOSIS — N95.0 POSTMENOPAUSAL BLEEDING: ICD-10-CM

## 2020-08-17 PROCEDURE — 76856 US EXAM PELVIC COMPLETE: CPT | Mod: TC,HCNC,PN

## 2020-08-17 PROCEDURE — 76856 US EXAM PELVIC COMPLETE: CPT | Mod: 26,HCNC,, | Performed by: RADIOLOGY

## 2020-08-17 PROCEDURE — 76856 US PELVIS COMPLETE NON OB: ICD-10-PCS | Mod: 26,HCNC,, | Performed by: RADIOLOGY

## 2020-09-24 NOTE — TELEPHONE ENCOUNTER
No new care gaps identified.  Powered by [employer]. Reference number: [id number]. 9/24/2020 11:53:19 AM   NIKKYT

## 2020-09-25 RX ORDER — PRAVASTATIN SODIUM 40 MG/1
TABLET ORAL
Qty: 90 TABLET | Refills: 3 | Status: SHIPPED | OUTPATIENT
Start: 2020-09-25 | End: 2021-11-10

## 2020-09-25 NOTE — PROGRESS NOTES
Refill Authorization Note   Ira Cooley is requesting a refill authorization.     Brief assessment and rationale for refill: APPROVE: prr           Medication Therapy Plan: CD    Medication reconciliation completed: No                    Comments:      Orders Placed This Encounter    pravastatin (PRAVACHOL) 40 MG tablet      Requested Prescriptions   Signed Prescriptions Disp Refills    pravastatin (PRAVACHOL) 40 MG tablet 90 tablet 3     Sig: TAKE 1 TABLET EVERY DAY       Cardiovascular:  Antilipid - Statins Passed - 9/24/2020 11:53 AM        Passed - Patient is at least 18 years old        Passed - Negative Pregnancy Status Check        Passed - Office Visit within last 12 months or future 90 days.     Recent Outpatient Visits            1 month ago Gynecologic exam normal    [facility] Nichelle Brown MD    2 months ago Encounter for preventive health examination    [facility] Sammi Osuna NP    3 months ago Routine physical examination    [facility] Sebast[facility] Pretty MD    1 year ago Encounter for preventive health examination    [facility] Sammi Osuna NP    1 year ago Routine physical examination    [facility] Sebastien Pretty MD                    Passed - ALT is 94 or below and within 360 days     ALT   Date Value Ref Range Status   06/20/2020 21 10 - 44 U/L Final   04/27/2019 24 10 - 44 U/L Final   04/07/2018 19 10 - 44 U/L Final              Passed - AST is 54 or below and within 360 days     AST   Date Value Ref Range Status   06/20/2020 27 10 - 40 U/L Final   04/27/2019 28 10 - 40 U/L Final   04/07/2018 25 10 - 40 U/L Final              Passed - Total Cholesterol within 360 days     Cholesterol   Date Value Ref Range Status   06/20/2020 226 (H) 120 - 199 mg/dL Final     Comment:     The National Cholesterol Education Program (NCEP) has set the  following guidelines (reference ranges) for  Cholesterol:  Optimal.....................<200 mg/dL  Borderline High.............200-239 mg/dL  High........................> or = 240 mg/dL     04/27/2019 202 (H) 120 - 199 mg/dL Final     Comment:     The National Cholesterol Education Program (NCEP) has set the  following guidelines (reference ranges) for Cholesterol:  Optimal.....................<200 mg/dL  Borderline High.............200-239 mg/dL  High........................> or = 240 mg/dL     04/07/2018 195 120 - 199 mg/dL Final     Comment:     The National Cholesterol Education Program (NCEP) has set the  following guidelines (reference ranges) for Cholesterol:  Optimal.....................<200 mg/dL  Borderline High.............200-239 mg/dL  High........................> or = 240 mg/dL                Passed - LDL within 360 days     LDL Cholesterol   Date Value Ref Range Status   06/20/2020 119.8 63.0 - 159.0 mg/dL Final     Comment:     The National Cholesterol Education Program (NCEP) has set the  following guidelines (reference values) for LDL Cholesterol:  Optimal.......................<130 mg/dL  Borderline High...............130-159 mg/dL  High..........................160-189 mg/dL  Very High.....................>190 mg/dL              Passed - HDL within 360 days     HDL   Date Value Ref Range Status   06/20/2020 54 40 - 75 mg/dL Final     Comment:     The National Cholesterol Education Program (NCEP) has set the  following guidelines (reference values) for HDL Cholesterol:  Low...............<40 mg/dL  Optimal...........>60 mg/dL              Passed - Triglycerides within 360 days     Triglycerides   Date Value Ref Range Status   06/20/2020 261 (H) 30 - 150 mg/dL Final     Comment:     The National Cholesterol Education Program (NCEP) has set the  following guidelines (reference values) for triglycerides:  Normal......................<150 mg/dL  Borderline High.............150-199 mg/dL  High........................200-499 mg/dL     04/27/2019  139 30 - 150 mg/dL Final     Comment:     The National Cholesterol Education Program (NCEP) has set the  following guidelines (reference values) for triglycerides:  Normal......................<150 mg/dL  Borderline High.............150-199 mg/dL  High........................200-499 mg/dL     04/07/2018 128 30 - 150 mg/dL Final     Comment:     The National Cholesterol Education Program (NCEP) has set the  following guidelines (reference values) for triglycerides:  Normal......................<150 mg/dL  Borderline High.............150-199 mg/dL  High........................200-499 mg/dL                    Appointments  past 12m or future 3m with PCP    Date Provider   Last Visit   6/24/2020 Sebastien Pretty MD   Next Visit   Visit date not found Sebastien Pretty MD   ED visits in past 90 days: 0     Note composed:10:41 AM 09/25/2020

## 2021-02-03 ENCOUNTER — PES CALL (OUTPATIENT)
Dept: ADMINISTRATIVE | Facility: CLINIC | Age: 46
End: 2021-02-03

## 2021-03-29 ENCOUNTER — OFFICE VISIT (OUTPATIENT)
Dept: FAMILY MEDICINE | Facility: CLINIC | Age: 46
End: 2021-03-29
Payer: MEDICARE

## 2021-03-29 VITALS
DIASTOLIC BLOOD PRESSURE: 70 MMHG | WEIGHT: 134.25 LBS | HEART RATE: 83 BPM | SYSTOLIC BLOOD PRESSURE: 122 MMHG | HEIGHT: 55 IN | OXYGEN SATURATION: 96 % | BODY MASS INDEX: 31.07 KG/M2

## 2021-03-29 DIAGNOSIS — E66.01 SEVERE OBESITY (BMI 35.0-39.9) WITH COMORBIDITY: ICD-10-CM

## 2021-03-29 DIAGNOSIS — Z00.00 ENCOUNTER FOR PREVENTIVE HEALTH EXAMINATION: Primary | ICD-10-CM

## 2021-03-29 PROCEDURE — 99999 PR PBB SHADOW E&M-EST. PATIENT-LVL III: CPT | Mod: PBBFAC,,, | Performed by: NURSE PRACTITIONER

## 2021-03-29 PROCEDURE — 3074F PR MOST RECENT SYSTOLIC BLOOD PRESSURE < 130 MM HG: ICD-10-PCS | Mod: CPTII,S$GLB,, | Performed by: NURSE PRACTITIONER

## 2021-03-29 PROCEDURE — G0439 PPPS, SUBSEQ VISIT: HCPCS | Mod: S$GLB,,, | Performed by: NURSE PRACTITIONER

## 2021-03-29 PROCEDURE — 99499 RISK ADDL DX/OHS AUDIT: ICD-10-PCS | Mod: S$GLB,,, | Performed by: NURSE PRACTITIONER

## 2021-03-29 PROCEDURE — 1126F AMNT PAIN NOTED NONE PRSNT: CPT | Mod: S$GLB,,, | Performed by: NURSE PRACTITIONER

## 2021-03-29 PROCEDURE — 3008F PR BODY MASS INDEX (BMI) DOCUMENTED: ICD-10-PCS | Mod: CPTII,S$GLB,, | Performed by: NURSE PRACTITIONER

## 2021-03-29 PROCEDURE — G0439 PR MEDICARE ANNUAL WELLNESS SUBSEQUENT VISIT: ICD-10-PCS | Mod: S$GLB,,, | Performed by: NURSE PRACTITIONER

## 2021-03-29 PROCEDURE — 3008F BODY MASS INDEX DOCD: CPT | Mod: CPTII,S$GLB,, | Performed by: NURSE PRACTITIONER

## 2021-03-29 PROCEDURE — 99999 PR PBB SHADOW E&M-EST. PATIENT-LVL III: ICD-10-PCS | Mod: PBBFAC,,, | Performed by: NURSE PRACTITIONER

## 2021-03-29 PROCEDURE — G9920 SCRNING PERF AND NEGATIVE: HCPCS | Mod: CPTII,S$GLB,, | Performed by: NURSE PRACTITIONER

## 2021-03-29 PROCEDURE — 3078F DIAST BP <80 MM HG: CPT | Mod: CPTII,S$GLB,, | Performed by: NURSE PRACTITIONER

## 2021-03-29 PROCEDURE — 1126F PR PAIN SEVERITY QUANTIFIED, NO PAIN PRESENT: ICD-10-PCS | Mod: S$GLB,,, | Performed by: NURSE PRACTITIONER

## 2021-03-29 PROCEDURE — 99499 UNLISTED E&M SERVICE: CPT | Mod: S$GLB,,, | Performed by: NURSE PRACTITIONER

## 2021-03-29 PROCEDURE — G9920 PR SCREENING AND NEGATIVE: ICD-10-PCS | Mod: CPTII,S$GLB,, | Performed by: NURSE PRACTITIONER

## 2021-03-29 PROCEDURE — 3074F SYST BP LT 130 MM HG: CPT | Mod: CPTII,S$GLB,, | Performed by: NURSE PRACTITIONER

## 2021-03-29 PROCEDURE — 3078F PR MOST RECENT DIASTOLIC BLOOD PRESSURE < 80 MM HG: ICD-10-PCS | Mod: CPTII,S$GLB,, | Performed by: NURSE PRACTITIONER

## 2021-06-10 ENCOUNTER — TELEPHONE (OUTPATIENT)
Dept: FAMILY MEDICINE | Facility: CLINIC | Age: 46
End: 2021-06-10

## 2021-06-10 DIAGNOSIS — E03.4 HYPOTHYROIDISM DUE TO ACQUIRED ATROPHY OF THYROID: ICD-10-CM

## 2021-06-10 RX ORDER — LEVOTHYROXINE SODIUM 25 UG/1
25 TABLET ORAL DAILY
Qty: 90 TABLET | Refills: 3 | Status: SHIPPED | OUTPATIENT
Start: 2021-06-10 | End: 2022-08-09

## 2021-07-06 ENCOUNTER — PATIENT OUTREACH (OUTPATIENT)
Dept: ADMINISTRATIVE | Facility: HOSPITAL | Age: 46
End: 2021-07-06

## 2021-07-20 ENCOUNTER — OFFICE VISIT (OUTPATIENT)
Dept: FAMILY MEDICINE | Facility: CLINIC | Age: 46
End: 2021-07-20
Payer: MEDICARE

## 2021-07-20 VITALS
SYSTOLIC BLOOD PRESSURE: 118 MMHG | BODY MASS INDEX: 31.07 KG/M2 | HEIGHT: 55 IN | OXYGEN SATURATION: 95 % | WEIGHT: 134.25 LBS | DIASTOLIC BLOOD PRESSURE: 76 MMHG | TEMPERATURE: 98 F | HEART RATE: 75 BPM

## 2021-07-20 DIAGNOSIS — E78.5 DYSLIPIDEMIA: ICD-10-CM

## 2021-07-20 DIAGNOSIS — E03.4 HYPOTHYROIDISM DUE TO ACQUIRED ATROPHY OF THYROID: ICD-10-CM

## 2021-07-20 DIAGNOSIS — Z00.00 ROUTINE PHYSICAL EXAMINATION: Primary | ICD-10-CM

## 2021-07-20 DIAGNOSIS — Z11.59 NEED FOR HEPATITIS C SCREENING TEST: ICD-10-CM

## 2021-07-20 DIAGNOSIS — Z12.31 ENCOUNTER FOR SCREENING MAMMOGRAM FOR BREAST CANCER: ICD-10-CM

## 2021-07-20 DIAGNOSIS — Z79.899 ENCOUNTER FOR LONG-TERM (CURRENT) USE OF MEDICATIONS: ICD-10-CM

## 2021-07-20 PROCEDURE — 3008F PR BODY MASS INDEX (BMI) DOCUMENTED: ICD-10-PCS | Mod: CPTII,S$GLB,, | Performed by: INTERNAL MEDICINE

## 2021-07-20 PROCEDURE — 1160F PR REVIEW ALL MEDS BY PRESCRIBER/CLIN PHARMACIST DOCUMENTED: ICD-10-PCS | Mod: CPTII,S$GLB,, | Performed by: INTERNAL MEDICINE

## 2021-07-20 PROCEDURE — 1126F AMNT PAIN NOTED NONE PRSNT: CPT | Mod: CPTII,S$GLB,, | Performed by: INTERNAL MEDICINE

## 2021-07-20 PROCEDURE — 99499 RISK ADDL DX/OHS AUDIT: ICD-10-PCS | Mod: S$GLB,,, | Performed by: INTERNAL MEDICINE

## 2021-07-20 PROCEDURE — 99499 UNLISTED E&M SERVICE: CPT | Mod: S$GLB,,, | Performed by: INTERNAL MEDICINE

## 2021-07-20 PROCEDURE — 99999 PR PBB SHADOW E&M-EST. PATIENT-LVL IV: ICD-10-PCS | Mod: PBBFAC,,, | Performed by: INTERNAL MEDICINE

## 2021-07-20 PROCEDURE — 99396 PR PREVENTIVE VISIT,EST,40-64: ICD-10-PCS | Mod: S$GLB,,, | Performed by: INTERNAL MEDICINE

## 2021-07-20 PROCEDURE — 3078F DIAST BP <80 MM HG: CPT | Mod: CPTII,S$GLB,, | Performed by: INTERNAL MEDICINE

## 2021-07-20 PROCEDURE — 3078F PR MOST RECENT DIASTOLIC BLOOD PRESSURE < 80 MM HG: ICD-10-PCS | Mod: CPTII,S$GLB,, | Performed by: INTERNAL MEDICINE

## 2021-07-20 PROCEDURE — 99396 PREV VISIT EST AGE 40-64: CPT | Mod: S$GLB,,, | Performed by: INTERNAL MEDICINE

## 2021-07-20 PROCEDURE — 1159F PR MEDICATION LIST DOCUMENTED IN MEDICAL RECORD: ICD-10-PCS | Mod: CPTII,S$GLB,, | Performed by: INTERNAL MEDICINE

## 2021-07-20 PROCEDURE — 99999 PR PBB SHADOW E&M-EST. PATIENT-LVL IV: CPT | Mod: PBBFAC,,, | Performed by: INTERNAL MEDICINE

## 2021-07-20 PROCEDURE — 3074F PR MOST RECENT SYSTOLIC BLOOD PRESSURE < 130 MM HG: ICD-10-PCS | Mod: CPTII,S$GLB,, | Performed by: INTERNAL MEDICINE

## 2021-07-20 PROCEDURE — 1159F MED LIST DOCD IN RCRD: CPT | Mod: CPTII,S$GLB,, | Performed by: INTERNAL MEDICINE

## 2021-07-20 PROCEDURE — 3008F BODY MASS INDEX DOCD: CPT | Mod: CPTII,S$GLB,, | Performed by: INTERNAL MEDICINE

## 2021-07-20 PROCEDURE — 1126F PR PAIN SEVERITY QUANTIFIED, NO PAIN PRESENT: ICD-10-PCS | Mod: CPTII,S$GLB,, | Performed by: INTERNAL MEDICINE

## 2021-07-20 PROCEDURE — 1160F RVW MEDS BY RX/DR IN RCRD: CPT | Mod: CPTII,S$GLB,, | Performed by: INTERNAL MEDICINE

## 2021-07-20 PROCEDURE — 3074F SYST BP LT 130 MM HG: CPT | Mod: CPTII,S$GLB,, | Performed by: INTERNAL MEDICINE

## 2021-08-03 ENCOUNTER — IMMUNIZATION (OUTPATIENT)
Dept: FAMILY MEDICINE | Facility: CLINIC | Age: 46
End: 2021-08-03
Payer: MEDICARE

## 2021-08-03 DIAGNOSIS — Z23 NEED FOR VACCINATION: Primary | ICD-10-CM

## 2021-08-03 PROCEDURE — 0001A COVID-19, MRNA, LNP-S, PF, 30 MCG/0.3 ML DOSE VACCINE: CPT | Mod: CV19,,, | Performed by: INTERNAL MEDICINE

## 2021-08-03 PROCEDURE — 91300 COVID-19, MRNA, LNP-S, PF, 30 MCG/0.3 ML DOSE VACCINE: ICD-10-PCS | Mod: ,,, | Performed by: INTERNAL MEDICINE

## 2021-08-03 PROCEDURE — 91300 COVID-19, MRNA, LNP-S, PF, 30 MCG/0.3 ML DOSE VACCINE: CPT | Mod: ,,, | Performed by: INTERNAL MEDICINE

## 2021-08-03 PROCEDURE — 0001A COVID-19, MRNA, LNP-S, PF, 30 MCG/0.3 ML DOSE VACCINE: ICD-10-PCS | Mod: CV19,,, | Performed by: INTERNAL MEDICINE

## 2021-08-07 ENCOUNTER — HOSPITAL ENCOUNTER (OUTPATIENT)
Dept: RADIOLOGY | Facility: HOSPITAL | Age: 46
Discharge: HOME | End: 2021-08-07
Attending: INTERNAL MEDICINE
Payer: MEDICARE

## 2021-08-07 DIAGNOSIS — Z00.00 ROUTINE PHYSICAL EXAMINATION: ICD-10-CM

## 2021-08-07 DIAGNOSIS — Z12.31 ENCOUNTER FOR SCREENING MAMMOGRAM FOR BREAST CANCER: ICD-10-CM

## 2021-08-07 PROCEDURE — 77067 MAMMO DIGITAL SCREENING BILAT WITH TOMO: ICD-10-PCS | Mod: 26,,, | Performed by: RADIOLOGY

## 2021-08-07 PROCEDURE — 77063 BREAST TOMOSYNTHESIS BI: CPT | Mod: 26,,, | Performed by: RADIOLOGY

## 2021-08-07 PROCEDURE — 77067 SCR MAMMO BI INCL CAD: CPT | Mod: 26,,, | Performed by: RADIOLOGY

## 2021-08-07 PROCEDURE — 77067 SCR MAMMO BI INCL CAD: CPT | Mod: TC,PO

## 2021-08-07 PROCEDURE — 77063 MAMMO DIGITAL SCREENING BILAT WITH TOMO: ICD-10-PCS | Mod: 26,,, | Performed by: RADIOLOGY

## 2021-08-10 ENCOUNTER — LAB VISIT (OUTPATIENT)
Dept: LAB | Facility: HOSPITAL | Age: 46
End: 2021-08-10
Attending: INTERNAL MEDICINE
Payer: MEDICARE

## 2021-08-10 DIAGNOSIS — Z00.00 ROUTINE PHYSICAL EXAMINATION: ICD-10-CM

## 2021-08-10 DIAGNOSIS — E03.4 HYPOTHYROIDISM DUE TO ACQUIRED ATROPHY OF THYROID: ICD-10-CM

## 2021-08-10 DIAGNOSIS — Z79.899 ENCOUNTER FOR LONG-TERM (CURRENT) USE OF MEDICATIONS: ICD-10-CM

## 2021-08-10 DIAGNOSIS — E78.5 DYSLIPIDEMIA: ICD-10-CM

## 2021-08-10 DIAGNOSIS — Z11.59 NEED FOR HEPATITIS C SCREENING TEST: ICD-10-CM

## 2021-08-10 LAB
ALBUMIN SERPL BCP-MCNC: 3.8 G/DL (ref 3.5–5.2)
ALP SERPL-CCNC: 79 U/L (ref 55–135)
ALT SERPL W/O P-5'-P-CCNC: 27 U/L (ref 10–44)
ANION GAP SERPL CALC-SCNC: 11 MMOL/L (ref 8–16)
AST SERPL-CCNC: 27 U/L (ref 10–40)
BASOPHILS # BLD AUTO: 0.1 K/UL (ref 0–0.2)
BASOPHILS NFR BLD: 2.2 % (ref 0–1.9)
BILIRUB SERPL-MCNC: 0.5 MG/DL (ref 0.1–1)
BUN SERPL-MCNC: 20 MG/DL (ref 6–20)
CALCIUM SERPL-MCNC: 9.7 MG/DL (ref 8.7–10.5)
CHLORIDE SERPL-SCNC: 104 MMOL/L (ref 95–110)
CHOLEST SERPL-MCNC: 187 MG/DL (ref 120–199)
CHOLEST/HDLC SERPL: 3.6 {RATIO} (ref 2–5)
CO2 SERPL-SCNC: 24 MMOL/L (ref 23–29)
CREAT SERPL-MCNC: 0.8 MG/DL (ref 0.5–1.4)
DIFFERENTIAL METHOD: ABNORMAL
EOSINOPHIL # BLD AUTO: 0.1 K/UL (ref 0–0.5)
EOSINOPHIL NFR BLD: 1.9 % (ref 0–8)
ERYTHROCYTE [DISTWIDTH] IN BLOOD BY AUTOMATED COUNT: 14.3 % (ref 11.5–14.5)
EST. GFR  (AFRICAN AMERICAN): >60 ML/MIN/1.73 M^2
EST. GFR  (NON AFRICAN AMERICAN): >60 ML/MIN/1.73 M^2
GLUCOSE SERPL-MCNC: 94 MG/DL (ref 70–110)
HCT VFR BLD AUTO: 42 % (ref 37–48.5)
HDLC SERPL-MCNC: 52 MG/DL (ref 40–75)
HDLC SERPL: 27.8 % (ref 20–50)
HGB BLD-MCNC: 14 G/DL (ref 12–16)
IMM GRANULOCYTES # BLD AUTO: 0.02 K/UL (ref 0–0.04)
IMM GRANULOCYTES NFR BLD AUTO: 0.4 % (ref 0–0.5)
LDLC SERPL CALC-MCNC: 99.8 MG/DL (ref 63–159)
LYMPHOCYTES # BLD AUTO: 1.3 K/UL (ref 1–4.8)
LYMPHOCYTES NFR BLD: 28.6 % (ref 18–48)
MCH RBC QN AUTO: 33.5 PG (ref 27–31)
MCHC RBC AUTO-ENTMCNC: 33.3 G/DL (ref 32–36)
MCV RBC AUTO: 101 FL (ref 82–98)
MONOCYTES # BLD AUTO: 0.4 K/UL (ref 0.3–1)
MONOCYTES NFR BLD: 8 % (ref 4–15)
NEUTROPHILS # BLD AUTO: 2.7 K/UL (ref 1.8–7.7)
NEUTROPHILS NFR BLD: 58.9 % (ref 38–73)
NONHDLC SERPL-MCNC: 135 MG/DL
NRBC BLD-RTO: 0 /100 WBC
PLATELET # BLD AUTO: 282 K/UL (ref 150–450)
PMV BLD AUTO: 10.5 FL (ref 9.2–12.9)
POTASSIUM SERPL-SCNC: 4.2 MMOL/L (ref 3.5–5.1)
PROT SERPL-MCNC: 7.2 G/DL (ref 6–8.4)
RBC # BLD AUTO: 4.18 M/UL (ref 4–5.4)
SODIUM SERPL-SCNC: 139 MMOL/L (ref 136–145)
TRIGL SERPL-MCNC: 176 MG/DL (ref 30–150)
TSH SERPL DL<=0.005 MIU/L-ACNC: 1.36 UIU/ML (ref 0.4–4)
WBC # BLD AUTO: 4.65 K/UL (ref 3.9–12.7)

## 2021-08-10 PROCEDURE — 80053 COMPREHEN METABOLIC PANEL: CPT | Performed by: INTERNAL MEDICINE

## 2021-08-10 PROCEDURE — 80061 LIPID PANEL: CPT | Performed by: INTERNAL MEDICINE

## 2021-08-10 PROCEDURE — 85025 COMPLETE CBC W/AUTO DIFF WBC: CPT | Performed by: INTERNAL MEDICINE

## 2021-08-10 PROCEDURE — 84443 ASSAY THYROID STIM HORMONE: CPT | Performed by: INTERNAL MEDICINE

## 2021-08-10 PROCEDURE — 36415 COLL VENOUS BLD VENIPUNCTURE: CPT | Mod: PO | Performed by: INTERNAL MEDICINE

## 2021-08-10 PROCEDURE — 86803 HEPATITIS C AB TEST: CPT | Performed by: INTERNAL MEDICINE

## 2021-08-11 LAB — HCV AB SERPL QL IA: NEGATIVE

## 2021-08-27 ENCOUNTER — IMMUNIZATION (OUTPATIENT)
Dept: FAMILY MEDICINE | Facility: CLINIC | Age: 46
End: 2021-08-27
Payer: MEDICARE

## 2021-08-27 DIAGNOSIS — Z23 NEED FOR VACCINATION: Primary | ICD-10-CM

## 2021-08-27 PROCEDURE — 91300 COVID-19, MRNA, LNP-S, PF, 30 MCG/0.3 ML DOSE VACCINE: ICD-10-PCS | Mod: ,,, | Performed by: FAMILY MEDICINE

## 2021-08-27 PROCEDURE — 0002A COVID-19, MRNA, LNP-S, PF, 30 MCG/0.3 ML DOSE VACCINE: CPT | Mod: CV19,,, | Performed by: FAMILY MEDICINE

## 2021-08-27 PROCEDURE — 0002A COVID-19, MRNA, LNP-S, PF, 30 MCG/0.3 ML DOSE VACCINE: ICD-10-PCS | Mod: CV19,,, | Performed by: FAMILY MEDICINE

## 2021-08-27 PROCEDURE — 91300 COVID-19, MRNA, LNP-S, PF, 30 MCG/0.3 ML DOSE VACCINE: CPT | Mod: ,,, | Performed by: FAMILY MEDICINE

## 2021-11-10 RX ORDER — PRAVASTATIN SODIUM 40 MG/1
TABLET ORAL
Qty: 90 TABLET | Refills: 0 | Status: SHIPPED | OUTPATIENT
Start: 2021-11-10 | End: 2022-02-16

## 2021-11-28 ENCOUNTER — PATIENT OUTREACH (OUTPATIENT)
Dept: ADMINISTRATIVE | Facility: OTHER | Age: 46
End: 2021-11-28
Payer: MEDICARE

## 2021-12-01 ENCOUNTER — CLINICAL SUPPORT (OUTPATIENT)
Dept: AUDIOLOGY | Facility: CLINIC | Age: 46
End: 2021-12-01
Payer: MEDICARE

## 2021-12-01 ENCOUNTER — OFFICE VISIT (OUTPATIENT)
Dept: OTOLARYNGOLOGY | Facility: CLINIC | Age: 46
End: 2021-12-01
Payer: MEDICARE

## 2021-12-01 VITALS — WEIGHT: 134.25 LBS | BODY MASS INDEX: 31.07 KG/M2 | HEIGHT: 55 IN

## 2021-12-01 DIAGNOSIS — H61.23 BILATERAL IMPACTED CERUMEN: ICD-10-CM

## 2021-12-01 DIAGNOSIS — H90.3 BILATERAL SENSORINEURAL HEARING LOSS: Primary | ICD-10-CM

## 2021-12-01 DIAGNOSIS — H91.90 HEARING DIFFICULTY, UNSPECIFIED LATERALITY: Primary | ICD-10-CM

## 2021-12-01 DIAGNOSIS — H91.90 HEARING DIFFICULTY, UNSPECIFIED LATERALITY: ICD-10-CM

## 2021-12-01 PROCEDURE — 99999 PR PBB SHADOW E&M-EST. PATIENT-LVL II: ICD-10-PCS | Mod: PBBFAC,HCNC,, | Performed by: NURSE PRACTITIONER

## 2021-12-01 PROCEDURE — 99999 PR PBB SHADOW E&M-EST. PATIENT-LVL I: CPT | Mod: PBBFAC,HCNC,,

## 2021-12-01 PROCEDURE — 92567 TYMPANOMETRY: CPT | Mod: HCNC,S$GLB,, | Performed by: AUDIOLOGIST-HEARING AID FITTER

## 2021-12-01 PROCEDURE — 92567 PR TYMPA2METRY: ICD-10-PCS | Mod: HCNC,S$GLB,, | Performed by: AUDIOLOGIST-HEARING AID FITTER

## 2021-12-01 PROCEDURE — 92557 PR COMPREHENSIVE HEARING TEST: ICD-10-PCS | Mod: HCNC,S$GLB,, | Performed by: AUDIOLOGIST-HEARING AID FITTER

## 2021-12-01 PROCEDURE — 99999 PR PBB SHADOW E&M-EST. PATIENT-LVL II: CPT | Mod: PBBFAC,HCNC,, | Performed by: NURSE PRACTITIONER

## 2021-12-01 PROCEDURE — 99999 PR PBB SHADOW E&M-EST. PATIENT-LVL I: ICD-10-PCS | Mod: PBBFAC,HCNC,,

## 2021-12-01 PROCEDURE — G0268 PR REMOVAL OF IMPACTED WAX MD: ICD-10-PCS | Mod: HCNC,S$GLB,, | Performed by: NURSE PRACTITIONER

## 2021-12-01 PROCEDURE — G0268 REMOVAL OF IMPACTED WAX MD: HCPCS | Mod: HCNC,S$GLB,, | Performed by: NURSE PRACTITIONER

## 2021-12-01 PROCEDURE — 99203 PR OFFICE/OUTPT VISIT, NEW, LEVL III, 30-44 MIN: ICD-10-PCS | Mod: 25,HCNC,S$GLB, | Performed by: NURSE PRACTITIONER

## 2021-12-01 PROCEDURE — 92557 COMPREHENSIVE HEARING TEST: CPT | Mod: HCNC,S$GLB,, | Performed by: AUDIOLOGIST-HEARING AID FITTER

## 2021-12-01 PROCEDURE — 99203 OFFICE O/P NEW LOW 30 MIN: CPT | Mod: 25,HCNC,S$GLB, | Performed by: NURSE PRACTITIONER

## 2021-12-13 ENCOUNTER — PES CALL (OUTPATIENT)
Dept: ADMINISTRATIVE | Facility: CLINIC | Age: 46
End: 2021-12-13
Payer: MEDICARE

## 2021-12-15 ENCOUNTER — OFFICE VISIT (OUTPATIENT)
Dept: OPTOMETRY | Facility: CLINIC | Age: 46
End: 2021-12-15
Payer: MEDICARE

## 2021-12-15 DIAGNOSIS — H52.203 MYOPIA WITH ASTIGMATISM AND PRESBYOPIA, BILATERAL: ICD-10-CM

## 2021-12-15 DIAGNOSIS — H52.13 MYOPIA WITH ASTIGMATISM AND PRESBYOPIA, BILATERAL: ICD-10-CM

## 2021-12-15 DIAGNOSIS — H53.003 AMBLYOPIA, BILATERAL: ICD-10-CM

## 2021-12-15 DIAGNOSIS — H02.005 ENTROPION OF BOTH LOWER EYELIDS: ICD-10-CM

## 2021-12-15 DIAGNOSIS — H25.13 NUCLEAR SCLEROSIS, BILATERAL: Primary | ICD-10-CM

## 2021-12-15 DIAGNOSIS — H20.813 HETEROCHROMIA OF IRIS OF BOTH EYES: ICD-10-CM

## 2021-12-15 DIAGNOSIS — H52.4 MYOPIA WITH ASTIGMATISM AND PRESBYOPIA, BILATERAL: ICD-10-CM

## 2021-12-15 DIAGNOSIS — H02.002 ENTROPION OF BOTH LOWER EYELIDS: ICD-10-CM

## 2021-12-15 DIAGNOSIS — Z13.5 GLAUCOMA SCREENING: ICD-10-CM

## 2021-12-15 PROCEDURE — 92004 COMPRE OPH EXAM NEW PT 1/>: CPT | Mod: HCNC,S$GLB,, | Performed by: OPTOMETRIST

## 2021-12-15 PROCEDURE — 99999 PR PBB SHADOW E&M-EST. PATIENT-LVL II: ICD-10-PCS | Mod: PBBFAC,HCNC,, | Performed by: OPTOMETRIST

## 2021-12-15 PROCEDURE — 92004 PR EYE EXAM, NEW PATIENT,COMPREHESV: ICD-10-PCS | Mod: HCNC,S$GLB,, | Performed by: OPTOMETRIST

## 2021-12-15 PROCEDURE — 99999 PR PBB SHADOW E&M-EST. PATIENT-LVL II: CPT | Mod: PBBFAC,HCNC,, | Performed by: OPTOMETRIST

## 2022-02-16 RX ORDER — PRAVASTATIN SODIUM 40 MG/1
TABLET ORAL
Qty: 90 TABLET | Refills: 1 | Status: SHIPPED | OUTPATIENT
Start: 2022-02-16 | End: 2022-11-18

## 2022-02-16 NOTE — TELEPHONE ENCOUNTER
No new care gaps identified.  Powered by [employer]. Reference number: [id number].   2/16/2022 9:24:32 AM CST

## 2022-02-16 NOTE — TELEPHONE ENCOUNTER
Refill Authorization Note   Ira Cooley  is requesting a refill authorization.  Brief Assessment and Rationale for Refill:  Approve     Medication Therapy Plan:           Comments:   --->Care Gap information included below if applicable.       Requested Prescriptions   Pending Prescriptions Disp Refills    pravastatin (PRAVACHOL) 40 MG tablet [Pharmacy Med Name: PRAVASTATIN SODIUM 40 MG Tablet] 90 tablet 1     Sig: TAKE 1 TABLET EVERY DAY       Cardiovascular:  Antilipid - Statins Passed - 2/16/2022  9:24 AM        Passed - Patient is at least 18 years old        Passed - Negative Pregnancy Status Check        Passed - Valid encounter within last 15 months     Recent Visits  Date Type Provider Dept   07/20/21 Office Visit Sebastien Pretty MD [facility]   06/24/20 Office Visit Sebastien Pretty MD [facility]   Showing recent visits within past 720 days and meeting all other requirements  Future Appointments  No visits were found meeting these conditions.  Showing future appointments within next 150 days and meeting all other requirements      Future Appointments              In 5 months Sebastien Pretty MD [facility]                Passed - ALT is 131 or below and within 360 days     ALT   Date Value Ref Range Status   08/10/2021 27 10 - 44 U/L Final   06/20/2020 21 10 - 44 U/L Final   04/27/2019 24 10 - 44 U/L Final              Passed - AST is 119 or below and within 360 days     AST   Date Value Ref Range Status   08/10/2021 27 10 - 40 U/L Final   06/20/2020 27 10 - 40 U/L Final   04/27/2019 28 10 - 40 U/L Final              Passed - Total Cholesterol within 360 days     Lab Results   Component Value Date    CHOL 187 08/10/2021    CHOL 226 (H) 06/20/2020    CHOL 202 (H) 04/27/2019              Passed - LDL within 360 days     LDL Cholesterol   Date Value Ref Range Status   08/10/2021 99.8 63.0 - 159.0 mg/dL Final     Comment:     The National Cholesterol Education Program  (NCEP) has set the  following guidelines (reference values) for LDL Cholesterol:  Optimal.......................<130 mg/dL  Borderline High...............130-159 mg/dL  High..........................160-189 mg/dL  Very High.....................>190 mg/dL              Passed - HDL within 360 days     HDL   Date Value Ref Range Status   08/10/2021 52 40 - 75 mg/dL Final     Comment:     The National Cholesterol Education Program (NCEP) has set the  following guidelines (reference values) for HDL Cholesterol:  Low...............<40 mg/dL  Optimal...........>60 mg/dL              Passed - Triglycerides within 360 days     Lab Results   Component Value Date    TRIG 176 (H) 08/10/2021    TRIG 261 (H) 06/20/2020    TRIG 139 04/27/2019                  Appointments  past 12m or future 3m with PCP    Date Provider   Last Visit   7/20/2021 Sebastien Pretty MD   Next Visit   7/21/2022 Sebastien Pretty MD   ED visits in past 90 days: 0     Note composed:11:27 AM 02/16/2022

## 2022-06-22 ENCOUNTER — TELEPHONE (OUTPATIENT)
Dept: FAMILY MEDICINE | Facility: CLINIC | Age: 47
End: 2022-06-22
Payer: MEDICARE

## 2022-06-22 DIAGNOSIS — Z00.00 ROUTINE PHYSICAL EXAMINATION: Primary | ICD-10-CM

## 2022-06-22 DIAGNOSIS — E03.4 HYPOTHYROIDISM DUE TO ACQUIRED ATROPHY OF THYROID: ICD-10-CM

## 2022-06-22 DIAGNOSIS — Z79.899 ENCOUNTER FOR LONG-TERM (CURRENT) USE OF MEDICATIONS: ICD-10-CM

## 2022-06-22 DIAGNOSIS — Z11.59 NEED FOR HEPATITIS C SCREENING TEST: ICD-10-CM

## 2022-06-22 DIAGNOSIS — E78.5 DYSLIPIDEMIA: ICD-10-CM

## 2022-06-22 NOTE — TELEPHONE ENCOUNTER
Given me error saying the TSH,Lipid, CBC, CMP are not covered.       Please let me know what you need me to do, this is the first time it has said that for me.

## 2022-06-22 NOTE — TELEPHONE ENCOUNTER
Thank you so much! I didn't know that.     I have them scheduled got the Saturday pt mother requested

## 2022-07-07 ENCOUNTER — PATIENT OUTREACH (OUTPATIENT)
Dept: ADMINISTRATIVE | Facility: HOSPITAL | Age: 47
End: 2022-07-07
Payer: MEDICARE

## 2022-07-07 NOTE — LETTER
July 7, 2022    Ira Cooley  [address]  [facility] [address]             [facility]  [address]  [facility] [address]  Phone: [phone] Dear Mrs. Cooley:    [facility] is committed to your overall health.  To help you get the most out of each of your visits, we will review your information to make sure you are up to date on all of your recommended tests and/or procedures.      Your [facility] primary care team has found that your chart shows you may be due for     Health Maintenance Due   Topic    Colorectal Cancer Screening     COVID-19 Vaccine (3 - Booster for Pfizer series)    Mammogram      If you have had any of the above done at another facility, please bring the records or information with you so that your record at [facility] will be complete.  If you would like to schedule any of these, please contact me.    If you are currently taking medication, please bring it with you to your appointment for review.      Sebastien Pretty MD and your [facility] Primary Care Team

## 2022-07-16 ENCOUNTER — LAB VISIT (OUTPATIENT)
Dept: LAB | Facility: HOSPITAL | Age: 47
End: 2022-07-16
Attending: INTERNAL MEDICINE
Payer: MEDICARE

## 2022-07-16 DIAGNOSIS — E78.5 DYSLIPIDEMIA: ICD-10-CM

## 2022-07-16 DIAGNOSIS — E03.4 HYPOTHYROIDISM DUE TO ACQUIRED ATROPHY OF THYROID: ICD-10-CM

## 2022-07-16 DIAGNOSIS — Z11.59 NEED FOR HEPATITIS C SCREENING TEST: ICD-10-CM

## 2022-07-16 DIAGNOSIS — Z79.899 ENCOUNTER FOR LONG-TERM (CURRENT) USE OF MEDICATIONS: ICD-10-CM

## 2022-07-16 LAB
ALBUMIN SERPL BCP-MCNC: 3.7 G/DL (ref 3.5–5.2)
ALP SERPL-CCNC: 92 U/L (ref 55–135)
ALT SERPL W/O P-5'-P-CCNC: 23 U/L (ref 10–44)
ANION GAP SERPL CALC-SCNC: 10 MMOL/L (ref 8–16)
AST SERPL-CCNC: 27 U/L (ref 10–40)
BASOPHILS # BLD AUTO: 0.09 K/UL (ref 0–0.2)
BASOPHILS NFR BLD: 1.9 % (ref 0–1.9)
BILIRUB SERPL-MCNC: 0.5 MG/DL (ref 0.1–1)
BUN SERPL-MCNC: 19 MG/DL (ref 6–20)
CALCIUM SERPL-MCNC: 9.6 MG/DL (ref 8.7–10.5)
CHLORIDE SERPL-SCNC: 102 MMOL/L (ref 95–110)
CHOLEST SERPL-MCNC: 220 MG/DL (ref 120–199)
CHOLEST/HDLC SERPL: 4.2 {RATIO} (ref 2–5)
CO2 SERPL-SCNC: 25 MMOL/L (ref 23–29)
CREAT SERPL-MCNC: 0.8 MG/DL (ref 0.5–1.4)
DIFFERENTIAL METHOD: ABNORMAL
EOSINOPHIL # BLD AUTO: 0.1 K/UL (ref 0–0.5)
EOSINOPHIL NFR BLD: 2.3 % (ref 0–8)
ERYTHROCYTE [DISTWIDTH] IN BLOOD BY AUTOMATED COUNT: 13.9 % (ref 11.5–14.5)
EST. GFR  (AFRICAN AMERICAN): >60 ML/MIN/1.73 M^2
EST. GFR  (NON AFRICAN AMERICAN): >60 ML/MIN/1.73 M^2
GLUCOSE SERPL-MCNC: 94 MG/DL (ref 70–110)
HCT VFR BLD AUTO: 44.6 % (ref 37–48.5)
HDLC SERPL-MCNC: 53 MG/DL (ref 40–75)
HDLC SERPL: 24.1 % (ref 20–50)
HGB BLD-MCNC: 14.6 G/DL (ref 12–16)
IMM GRANULOCYTES # BLD AUTO: 0.02 K/UL (ref 0–0.04)
IMM GRANULOCYTES NFR BLD AUTO: 0.4 % (ref 0–0.5)
LDLC SERPL CALC-MCNC: 120.2 MG/DL (ref 63–159)
LYMPHOCYTES # BLD AUTO: 1.5 K/UL (ref 1–4.8)
LYMPHOCYTES NFR BLD: 30.2 % (ref 18–48)
MCH RBC QN AUTO: 33.7 PG (ref 27–31)
MCHC RBC AUTO-ENTMCNC: 32.7 G/DL (ref 32–36)
MCV RBC AUTO: 103 FL (ref 82–98)
MONOCYTES # BLD AUTO: 0.5 K/UL (ref 0.3–1)
MONOCYTES NFR BLD: 10.4 % (ref 4–15)
NEUTROPHILS # BLD AUTO: 2.7 K/UL (ref 1.8–7.7)
NEUTROPHILS NFR BLD: 54.8 % (ref 38–73)
NONHDLC SERPL-MCNC: 167 MG/DL
NRBC BLD-RTO: 0 /100 WBC
PLATELET # BLD AUTO: 307 K/UL (ref 150–450)
PMV BLD AUTO: 9.9 FL (ref 9.2–12.9)
POTASSIUM SERPL-SCNC: 4.4 MMOL/L (ref 3.5–5.1)
PROT SERPL-MCNC: 7.2 G/DL (ref 6–8.4)
RBC # BLD AUTO: 4.33 M/UL (ref 4–5.4)
SODIUM SERPL-SCNC: 137 MMOL/L (ref 136–145)
TRIGL SERPL-MCNC: 234 MG/DL (ref 30–150)
TSH SERPL DL<=0.005 MIU/L-ACNC: 2.6 UIU/ML (ref 0.4–4)
WBC # BLD AUTO: 4.83 K/UL (ref 3.9–12.7)

## 2022-07-16 PROCEDURE — 36415 COLL VENOUS BLD VENIPUNCTURE: CPT | Mod: PO | Performed by: INTERNAL MEDICINE

## 2022-07-16 PROCEDURE — 80061 LIPID PANEL: CPT | Performed by: INTERNAL MEDICINE

## 2022-07-16 PROCEDURE — 85025 COMPLETE CBC W/AUTO DIFF WBC: CPT | Performed by: INTERNAL MEDICINE

## 2022-07-16 PROCEDURE — 84443 ASSAY THYROID STIM HORMONE: CPT | Performed by: INTERNAL MEDICINE

## 2022-07-16 PROCEDURE — 80053 COMPREHEN METABOLIC PANEL: CPT | Performed by: INTERNAL MEDICINE

## 2022-07-16 PROCEDURE — 86803 HEPATITIS C AB TEST: CPT | Performed by: INTERNAL MEDICINE

## 2022-07-18 LAB — HCV AB SERPL QL IA: NEGATIVE

## 2022-07-21 ENCOUNTER — OFFICE VISIT (OUTPATIENT)
Dept: FAMILY MEDICINE | Facility: CLINIC | Age: 47
End: 2022-07-21
Payer: MEDICARE

## 2022-07-21 ENCOUNTER — TELEPHONE (OUTPATIENT)
Dept: GASTROENTEROLOGY | Facility: CLINIC | Age: 47
End: 2022-07-21
Payer: MEDICARE

## 2022-07-21 VITALS
SYSTOLIC BLOOD PRESSURE: 108 MMHG | DIASTOLIC BLOOD PRESSURE: 72 MMHG | WEIGHT: 136.69 LBS | OXYGEN SATURATION: 95 % | HEIGHT: 55 IN | TEMPERATURE: 99 F | BODY MASS INDEX: 31.63 KG/M2 | HEART RATE: 85 BPM

## 2022-07-21 DIAGNOSIS — E03.4 HYPOTHYROIDISM DUE TO ACQUIRED ATROPHY OF THYROID: ICD-10-CM

## 2022-07-21 DIAGNOSIS — E66.01 SEVERE OBESITY (BMI 35.0-39.9) WITH COMORBIDITY: ICD-10-CM

## 2022-07-21 DIAGNOSIS — Z12.11 SCREEN FOR COLON CANCER: ICD-10-CM

## 2022-07-21 DIAGNOSIS — Z12.31 ENCOUNTER FOR SCREENING MAMMOGRAM FOR BREAST CANCER: ICD-10-CM

## 2022-07-21 DIAGNOSIS — E78.5 DYSLIPIDEMIA: ICD-10-CM

## 2022-07-21 DIAGNOSIS — Z00.00 ROUTINE PHYSICAL EXAMINATION: Primary | ICD-10-CM

## 2022-07-21 PROCEDURE — 3078F PR MOST RECENT DIASTOLIC BLOOD PRESSURE < 80 MM HG: ICD-10-PCS | Mod: CPTII,S$GLB,, | Performed by: INTERNAL MEDICINE

## 2022-07-21 PROCEDURE — 1159F PR MEDICATION LIST DOCUMENTED IN MEDICAL RECORD: ICD-10-PCS | Mod: CPTII,S$GLB,, | Performed by: INTERNAL MEDICINE

## 2022-07-21 PROCEDURE — 3008F BODY MASS INDEX DOCD: CPT | Mod: CPTII,S$GLB,, | Performed by: INTERNAL MEDICINE

## 2022-07-21 PROCEDURE — 3074F SYST BP LT 130 MM HG: CPT | Mod: CPTII,S$GLB,, | Performed by: INTERNAL MEDICINE

## 2022-07-21 PROCEDURE — 3078F DIAST BP <80 MM HG: CPT | Mod: CPTII,S$GLB,, | Performed by: INTERNAL MEDICINE

## 2022-07-21 PROCEDURE — 3074F PR MOST RECENT SYSTOLIC BLOOD PRESSURE < 130 MM HG: ICD-10-PCS | Mod: CPTII,S$GLB,, | Performed by: INTERNAL MEDICINE

## 2022-07-21 PROCEDURE — 99396 PR PREVENTIVE VISIT,EST,40-64: ICD-10-PCS | Mod: S$GLB,,, | Performed by: INTERNAL MEDICINE

## 2022-07-21 PROCEDURE — 3008F PR BODY MASS INDEX (BMI) DOCUMENTED: ICD-10-PCS | Mod: CPTII,S$GLB,, | Performed by: INTERNAL MEDICINE

## 2022-07-21 PROCEDURE — 99999 PR PBB SHADOW E&M-EST. PATIENT-LVL III: CPT | Mod: PBBFAC,,, | Performed by: INTERNAL MEDICINE

## 2022-07-21 PROCEDURE — 99396 PREV VISIT EST AGE 40-64: CPT | Mod: S$GLB,,, | Performed by: INTERNAL MEDICINE

## 2022-07-21 PROCEDURE — 99999 PR PBB SHADOW E&M-EST. PATIENT-LVL III: ICD-10-PCS | Mod: PBBFAC,,, | Performed by: INTERNAL MEDICINE

## 2022-07-21 PROCEDURE — 1159F MED LIST DOCD IN RCRD: CPT | Mod: CPTII,S$GLB,, | Performed by: INTERNAL MEDICINE

## 2022-07-21 NOTE — PROGRESS NOTES
Subjective:       Patient ID: Ira Cooley is a 46 y.o. female.    Chief Complaint: Annual Exam and Knee Pain    Here for routine health maintenance.    Down syndrome - lives with her mother who cares for her.  Exercising 4-5x per week with mother.    Obesity - eats poorly   Hypothryoid -  Controlled  HLD - controlled LDL; high triglycerides    Review of Systems   Constitutional: Negative for appetite change and fever.   HENT: Negative for nosebleeds and trouble swallowing.    Eyes: Negative for discharge and visual disturbance.   Respiratory: Negative for choking and shortness of breath.    Cardiovascular: Negative for chest pain and palpitations.   Gastrointestinal: Negative for abdominal pain, nausea and vomiting.   Musculoskeletal: Negative for arthralgias and joint swelling.   Skin: Negative for rash and wound.   Neurological: Negative for dizziness and syncope.   Psychiatric/Behavioral: Negative for confusion and dysphoric mood.       Objective:      Vitals:    07/21/22 1115   BP: 108/72   Pulse: 85   Temp: 98.8 °F (37.1 °C)     Physical Exam  Vitals reviewed.   Eyes:      Conjunctiva/sclera: Conjunctivae normal.   Neck:      Thyroid: No thyromegaly.      Trachea: Trachea normal.   Cardiovascular:      Heart sounds: Normal heart sounds.      Comments: Edema negative  Pulmonary:      Effort: Pulmonary effort is normal.      Breath sounds: Normal breath sounds.   Abdominal:      Palpations: Abdomen is soft. There is no hepatomegaly.   Musculoskeletal:      Cervical back: Normal range of motion.   Skin:     General: Skin is warm and dry.   Neurological:      Cranial Nerves: No cranial nerve deficit.      Comments: DTR decreased bilateral   Psychiatric:      Comments: Alert and Oriented            Assessment:       1. Routine physical examination    2. Hypothyroidism due to acquired atrophy of thyroid    3. Dyslipidemia    4. Screen for colon cancer    5. Encounter for screening mammogram for breast cancer     6. Severe obesity (BMI 35.0-39.9) with comorbidity        Plan:       Routine physical examination    Hypothyroidism due to acquired atrophy of thyroid  -     TSH; Future; Expected date: 01/17/2023    Dyslipidemia  -     Comprehensive Metabolic Panel; Future; Expected date: 01/17/2023  -     Lipid Panel; Future; Expected date: 01/17/2023    Screen for colon cancer  -     Case Request Endoscopy: COLONOSCOPY    Encounter for screening mammogram for breast cancer  -     Mammo Digital Screening Bilat; Future; Expected date: 08/08/2022    Severe obesity (BMI 35.0-39.9) with comorbidity            Medication List with Changes/Refills   Current Medications    BIFIDOBACTERIUM INFANTIS (ALIGN ORAL)    Take by mouth.    BIOTIN ORAL    Take 1 tablet by mouth once daily.    ERGOCALCIFEROL, VITAMIN D2, (VITAMIN D2 ORAL)    Take by mouth once daily.    LEVOTHYROXINE (SYNTHROID) 25 MCG TABLET    Take 1 tablet (25 mcg total) by mouth once daily.    MULTIVIT WITH CALCIUM,IRON,MIN (MULTIPLE VITAMIN, WOMENS ORAL)    Take by mouth.    PRAVASTATIN (PRAVACHOL) 40 MG TABLET    TAKE 1 TABLET EVERY DAY     Wellness reviewed   Continue current management and monitor.    Counseled on regular exercise, maintenance of a healthy weight, balanced diet rich in fruits/vegetables and lean protein, and avoidance of unhealthy habits like smoking and excessive alcohol intake.   Also, counseled on importance of being compliant with medication, health appointments, diet and exercise.     Follow up in about 6 months (around 1/21/2023).

## 2022-08-09 ENCOUNTER — PES CALL (OUTPATIENT)
Dept: ADMINISTRATIVE | Facility: CLINIC | Age: 47
End: 2022-08-09
Payer: MEDICARE

## 2022-08-09 DIAGNOSIS — E03.4 HYPOTHYROIDISM DUE TO ACQUIRED ATROPHY OF THYROID: ICD-10-CM

## 2022-08-09 RX ORDER — LEVOTHYROXINE SODIUM 25 UG/1
TABLET ORAL
Qty: 90 TABLET | Refills: 3 | Status: SHIPPED | OUTPATIENT
Start: 2022-08-09 | End: 2023-08-16

## 2022-08-09 NOTE — TELEPHONE ENCOUNTER
No new care gaps identified.  [facility] Embedded Care Gaps. Reference number: [id number]. 8/09/2022   10:51:25 AM CDT

## 2022-08-09 NOTE — TELEPHONE ENCOUNTER
Refill Decision Note   Ira Cooley  is requesting a refill authorization.  Brief Assessment and Rationale for Refill:  Approve     Medication Therapy Plan:  TSH within normal range    Medication Reconciliation Completed: No   Comments:     No Care Gaps recommended.     Note composed:2:13 PM 08/09/2022

## 2022-08-19 ENCOUNTER — TELEPHONE (OUTPATIENT)
Dept: GASTROENTEROLOGY | Facility: CLINIC | Age: 47
End: 2022-08-19
Payer: MEDICARE

## 2022-08-19 NOTE — TELEPHONE ENCOUNTER
Attempts have been made to contact patient with no response. Case request canceled at this time. PCP notified. Phone number provided for call back. Pt will be scheduled from this order upon call back.  Letter mailed to pts home as 3rd attempt to contact.

## 2022-08-25 ENCOUNTER — HOSPITAL ENCOUNTER (OUTPATIENT)
Dept: RADIOLOGY | Facility: HOSPITAL | Age: 47
Discharge: HOME | End: 2022-08-25
Attending: INTERNAL MEDICINE
Payer: MEDICARE

## 2022-08-25 DIAGNOSIS — Z12.31 ENCOUNTER FOR SCREENING MAMMOGRAM FOR BREAST CANCER: ICD-10-CM

## 2022-08-25 PROCEDURE — 77067 MAMMO DIGITAL SCREENING BILAT WITH TOMO: ICD-10-PCS | Mod: 26,,, | Performed by: RADIOLOGY

## 2022-08-25 PROCEDURE — 77063 MAMMO DIGITAL SCREENING BILAT WITH TOMO: ICD-10-PCS | Mod: 26,,, | Performed by: RADIOLOGY

## 2022-08-25 PROCEDURE — 77063 BREAST TOMOSYNTHESIS BI: CPT | Mod: TC,PO

## 2022-08-25 PROCEDURE — 77063 BREAST TOMOSYNTHESIS BI: CPT | Mod: 26,,, | Performed by: RADIOLOGY

## 2022-08-25 PROCEDURE — 77067 SCR MAMMO BI INCL CAD: CPT | Mod: 26,,, | Performed by: RADIOLOGY

## 2022-08-26 ENCOUNTER — TELEPHONE (OUTPATIENT)
Dept: FAMILY MEDICINE | Facility: CLINIC | Age: 47
End: 2022-08-26
Payer: MEDICARE

## 2022-10-25 ENCOUNTER — PES CALL (OUTPATIENT)
Dept: ADMINISTRATIVE | Facility: CLINIC | Age: 47
End: 2022-10-25
Payer: MEDICARE

## 2022-12-02 ENCOUNTER — OFFICE VISIT (OUTPATIENT)
Dept: OTOLARYNGOLOGY | Facility: CLINIC | Age: 47
End: 2022-12-02
Payer: MEDICARE

## 2022-12-02 VITALS — BODY MASS INDEX: 31.63 KG/M2 | HEIGHT: 55 IN | TEMPERATURE: 99 F | WEIGHT: 136.69 LBS

## 2022-12-02 DIAGNOSIS — R09.89 THROAT CLEARING: ICD-10-CM

## 2022-12-02 DIAGNOSIS — Q90.9 DOWN SYNDROME: ICD-10-CM

## 2022-12-02 DIAGNOSIS — H61.23 BILATERAL IMPACTED CERUMEN: Primary | ICD-10-CM

## 2022-12-02 DIAGNOSIS — Q17.9 CONGENITAL SMALL EAR CANAL: ICD-10-CM

## 2022-12-02 PROCEDURE — 69210 REMOVE IMPACTED EAR WAX UNI: CPT | Mod: S$GLB,,, | Performed by: NURSE PRACTITIONER

## 2022-12-02 PROCEDURE — 1159F PR MEDICATION LIST DOCUMENTED IN MEDICAL RECORD: ICD-10-PCS | Mod: CPTII,S$GLB,, | Performed by: NURSE PRACTITIONER

## 2022-12-02 PROCEDURE — 3008F BODY MASS INDEX DOCD: CPT | Mod: CPTII,S$GLB,, | Performed by: NURSE PRACTITIONER

## 2022-12-02 PROCEDURE — 69210 PR REMOVAL IMPACTED CERUMEN REQUIRING INSTRUMENTATION, UNILATERAL: ICD-10-PCS | Mod: S$GLB,,, | Performed by: NURSE PRACTITIONER

## 2022-12-02 PROCEDURE — 1159F MED LIST DOCD IN RCRD: CPT | Mod: CPTII,S$GLB,, | Performed by: NURSE PRACTITIONER

## 2022-12-02 PROCEDURE — 99999 PR PBB SHADOW E&M-EST. PATIENT-LVL II: ICD-10-PCS | Mod: PBBFAC,,, | Performed by: NURSE PRACTITIONER

## 2022-12-02 PROCEDURE — 99213 OFFICE O/P EST LOW 20 MIN: CPT | Mod: 25,S$GLB,, | Performed by: NURSE PRACTITIONER

## 2022-12-02 PROCEDURE — 99213 PR OFFICE/OUTPT VISIT, EST, LEVL III, 20-29 MIN: ICD-10-PCS | Mod: 25,S$GLB,, | Performed by: NURSE PRACTITIONER

## 2022-12-02 PROCEDURE — 99999 PR PBB SHADOW E&M-EST. PATIENT-LVL II: CPT | Mod: PBBFAC,,, | Performed by: NURSE PRACTITIONER

## 2022-12-02 PROCEDURE — 3008F PR BODY MASS INDEX (BMI) DOCUMENTED: ICD-10-PCS | Mod: CPTII,S$GLB,, | Performed by: NURSE PRACTITIONER

## 2022-12-02 NOTE — PROGRESS NOTES
Subjective:       Patient ID: Ira Cooley is a 47 y.o. female.    Chief Complaint: No chief complaint on file.    HPI   Patient last seen one year ago for hearing loss. Patient having trouble hearing and recently informed of cerumen impactions in both ears. No c/o otalgia. No otorrhea. Mother states patient has chronic cough/throat clearing but denies sore throat or other symptoms.     Review of Systems   Constitutional: Negative.    HENT: Negative.     Eyes: Negative.    Respiratory: Negative.     Cardiovascular: Negative.    Gastrointestinal: Negative.    Musculoskeletal: Negative.    Integumentary:  Negative.   Neurological: Negative.    Hematological: Negative.    Psychiatric/Behavioral: Negative.         Objective:      Physical Exam  Vitals and nursing note reviewed.   Constitutional:       General: She is not in acute distress.     Appearance: She is well-developed. She is not ill-appearing or diaphoretic.   HENT:      Head: Normocephalic and atraumatic.      Right Ear: Hearing, tympanic membrane, ear canal and external ear normal. No middle ear effusion. Tympanic membrane is not erythematous.      Left Ear: Hearing, tympanic membrane, ear canal and external ear normal.  No middle ear effusion. Tympanic membrane is not erythematous.      Nose: Nose normal.   Eyes:      General: Lids are normal. No scleral icterus.        Right eye: No discharge.         Left eye: No discharge.   Neck:      Trachea: Trachea normal. No tracheal deviation.   Cardiovascular:      Rate and Rhythm: Normal rate.   Pulmonary:      Effort: Pulmonary effort is normal. No respiratory distress.      Breath sounds: No stridor. No wheezing.   Musculoskeletal:         General: Normal range of motion.      Cervical back: Normal range of motion and neck supple.   Skin:     General: Skin is warm and dry.      Coloration: Skin is not pale.   Neurological:      Mental Status: She is alert and oriented to person, place, and time.       Coordination: Coordination normal.      Gait: Gait normal.   Psychiatric:         Speech: Speech normal.         Behavior: Behavior normal. Behavior is cooperative.         Thought Content: Thought content normal.         Judgment: Judgment normal.       SEPARATE PROCEDURE IN OFFICE:   Procedure: Removal of impacted cerumen, bilateral   Pre Procedure Diagnosis: Cerumen Impaction   Post Procedure Diagnosis: Cerumen Impaction   Verbal informed consent in regards to risk of trauma to ear canal, ear drum or hearing, discomfort during procedure and/or inability to remove cerumen impaction in one session or unforeseen events or complications.   No anesthesia.     Procedure in detail:   Ear canal visualized bilateral with appropriate size ear speculum utilizing Operating Head Binocular Otomicroscope   Utilizing the following:  Ring curet, delicate alligator forceps used. The impacted cerumen of the ear canals was removed atraumatically. The TM and EAC were then inspected and found to be clear of wax. See description of TMs/EACs in PE above.   Complications: No   Condition: Improved/Good     Assessment:       Problem List Items Addressed This Visit       Down syndrome     Other Visit Diagnoses       Bilateral impacted cerumen    -  Primary    Congenital small ear canal                Plan:     Debrox prn  Pepcid OTC QHS    Patient encouraged to return to clinic if symptoms worsen/persist and as needed for further ENT symptoms or concerns.

## 2023-01-10 ENCOUNTER — PES CALL (OUTPATIENT)
Dept: ADMINISTRATIVE | Facility: CLINIC | Age: 48
End: 2023-01-10
Payer: MEDICARE

## 2023-01-10 ENCOUNTER — PATIENT OUTREACH (OUTPATIENT)
Dept: ADMINISTRATIVE | Facility: HOSPITAL | Age: 48
End: 2023-01-10
Payer: MEDICARE

## 2023-01-19 ENCOUNTER — LAB VISIT (OUTPATIENT)
Dept: LAB | Facility: HOSPITAL | Age: 48
End: 2023-01-19
Attending: INTERNAL MEDICINE
Payer: MEDICARE

## 2023-01-19 DIAGNOSIS — E03.4 HYPOTHYROIDISM DUE TO ACQUIRED ATROPHY OF THYROID: ICD-10-CM

## 2023-01-19 DIAGNOSIS — E78.5 DYSLIPIDEMIA: ICD-10-CM

## 2023-01-19 LAB
ALBUMIN SERPL BCP-MCNC: 3.8 G/DL (ref 3.5–5.2)
ALP SERPL-CCNC: 91 U/L (ref 55–135)
ALT SERPL W/O P-5'-P-CCNC: 18 U/L (ref 10–44)
ANION GAP SERPL CALC-SCNC: 9 MMOL/L (ref 8–16)
AST SERPL-CCNC: 27 U/L (ref 10–40)
BILIRUB SERPL-MCNC: 0.7 MG/DL (ref 0.1–1)
BUN SERPL-MCNC: 19 MG/DL (ref 6–20)
CALCIUM SERPL-MCNC: 10.3 MG/DL (ref 8.7–10.5)
CHLORIDE SERPL-SCNC: 105 MMOL/L (ref 95–110)
CHOLEST SERPL-MCNC: 211 MG/DL (ref 120–199)
CHOLEST/HDLC SERPL: 3.9 {RATIO} (ref 2–5)
CO2 SERPL-SCNC: 28 MMOL/L (ref 23–29)
CREAT SERPL-MCNC: 0.7 MG/DL (ref 0.5–1.4)
EST. GFR  (NO RACE VARIABLE): >60 ML/MIN/1.73 M^2
GLUCOSE SERPL-MCNC: 98 MG/DL (ref 70–110)
HDLC SERPL-MCNC: 54 MG/DL (ref 40–75)
HDLC SERPL: 25.6 % (ref 20–50)
LDLC SERPL CALC-MCNC: 122.2 MG/DL (ref 63–159)
NONHDLC SERPL-MCNC: 157 MG/DL
POTASSIUM SERPL-SCNC: 4.1 MMOL/L (ref 3.5–5.1)
PROT SERPL-MCNC: 7.4 G/DL (ref 6–8.4)
SODIUM SERPL-SCNC: 142 MMOL/L (ref 136–145)
TRIGL SERPL-MCNC: 174 MG/DL (ref 30–150)
TSH SERPL DL<=0.005 MIU/L-ACNC: 1.96 UIU/ML (ref 0.4–4)

## 2023-01-19 PROCEDURE — 36415 COLL VENOUS BLD VENIPUNCTURE: CPT | Mod: HCNC,PO | Performed by: INTERNAL MEDICINE

## 2023-01-19 PROCEDURE — 84443 ASSAY THYROID STIM HORMONE: CPT | Mod: HCNC | Performed by: INTERNAL MEDICINE

## 2023-01-19 PROCEDURE — 80061 LIPID PANEL: CPT | Mod: HCNC | Performed by: INTERNAL MEDICINE

## 2023-01-19 PROCEDURE — 80053 COMPREHEN METABOLIC PANEL: CPT | Mod: HCNC | Performed by: INTERNAL MEDICINE

## 2023-01-24 ENCOUNTER — OFFICE VISIT (OUTPATIENT)
Dept: FAMILY MEDICINE | Facility: CLINIC | Age: 48
End: 2023-01-24
Payer: MEDICARE

## 2023-01-24 ENCOUNTER — TELEPHONE (OUTPATIENT)
Dept: FAMILY MEDICINE | Facility: CLINIC | Age: 48
End: 2023-01-24

## 2023-01-24 VITALS — HEART RATE: 60 BPM | HEIGHT: 55 IN | BODY MASS INDEX: 31.37 KG/M2 | WEIGHT: 135.56 LBS | OXYGEN SATURATION: 98 %

## 2023-01-24 DIAGNOSIS — Z79.899 ENCOUNTER FOR LONG-TERM (CURRENT) USE OF MEDICATIONS: ICD-10-CM

## 2023-01-24 DIAGNOSIS — E03.4 HYPOTHYROIDISM DUE TO ACQUIRED ATROPHY OF THYROID: Primary | ICD-10-CM

## 2023-01-24 DIAGNOSIS — E66.01 SEVERE OBESITY (BMI 35.0-39.9) WITH COMORBIDITY: ICD-10-CM

## 2023-01-24 DIAGNOSIS — E78.5 DYSLIPIDEMIA: ICD-10-CM

## 2023-01-24 DIAGNOSIS — Q90.9 DOWN SYNDROME: ICD-10-CM

## 2023-01-24 PROCEDURE — 99999 PR PBB SHADOW E&M-EST. PATIENT-LVL III: CPT | Mod: PBBFAC,HCNC,, | Performed by: INTERNAL MEDICINE

## 2023-01-24 PROCEDURE — 90686 IIV4 VACC NO PRSV 0.5 ML IM: CPT | Mod: HCNC,S$GLB,, | Performed by: INTERNAL MEDICINE

## 2023-01-24 PROCEDURE — 1159F MED LIST DOCD IN RCRD: CPT | Mod: HCNC,CPTII,S$GLB, | Performed by: INTERNAL MEDICINE

## 2023-01-24 PROCEDURE — 99214 OFFICE O/P EST MOD 30 MIN: CPT | Mod: HCNC,S$GLB,, | Performed by: INTERNAL MEDICINE

## 2023-01-24 PROCEDURE — 99999 PR PBB SHADOW E&M-EST. PATIENT-LVL III: ICD-10-PCS | Mod: PBBFAC,HCNC,, | Performed by: INTERNAL MEDICINE

## 2023-01-24 PROCEDURE — 3008F BODY MASS INDEX DOCD: CPT | Mod: HCNC,CPTII,S$GLB, | Performed by: INTERNAL MEDICINE

## 2023-01-24 PROCEDURE — 1159F PR MEDICATION LIST DOCUMENTED IN MEDICAL RECORD: ICD-10-PCS | Mod: HCNC,CPTII,S$GLB, | Performed by: INTERNAL MEDICINE

## 2023-01-24 PROCEDURE — G0008 ADMIN INFLUENZA VIRUS VAC: HCPCS | Mod: HCNC,S$GLB,, | Performed by: INTERNAL MEDICINE

## 2023-01-24 PROCEDURE — 1160F PR REVIEW ALL MEDS BY PRESCRIBER/CLIN PHARMACIST DOCUMENTED: ICD-10-PCS | Mod: HCNC,CPTII,S$GLB, | Performed by: INTERNAL MEDICINE

## 2023-01-24 PROCEDURE — 99214 PR OFFICE/OUTPT VISIT, EST, LEVL IV, 30-39 MIN: ICD-10-PCS | Mod: HCNC,S$GLB,, | Performed by: INTERNAL MEDICINE

## 2023-01-24 PROCEDURE — 3008F PR BODY MASS INDEX (BMI) DOCUMENTED: ICD-10-PCS | Mod: HCNC,CPTII,S$GLB, | Performed by: INTERNAL MEDICINE

## 2023-01-24 PROCEDURE — 90686 FLU VACCINE (QUAD) GREATER THAN OR EQUAL TO 3YO PRESERVATIVE FREE IM: ICD-10-PCS | Mod: HCNC,S$GLB,, | Performed by: INTERNAL MEDICINE

## 2023-01-24 PROCEDURE — 1160F RVW MEDS BY RX/DR IN RCRD: CPT | Mod: HCNC,CPTII,S$GLB, | Performed by: INTERNAL MEDICINE

## 2023-01-24 PROCEDURE — G0008 FLU VACCINE (QUAD) GREATER THAN OR EQUAL TO 3YO PRESERVATIVE FREE IM: ICD-10-PCS | Mod: HCNC,S$GLB,, | Performed by: INTERNAL MEDICINE

## 2023-01-24 NOTE — PROGRESS NOTES
Subjective:       Patient ID: Ira Cooley is a 47 y.o. female.  Chief Complaint: Hyperlipidemia     HPI    Down syndrome - lives with her mother who cares for her.  Exercising 4-5x per week with mother.    Obesity - eats poorly   Hypothryoid -  Controlled  HLD - controlled LDL; high triglycerides        Assessment:       1. Hypothyroidism due to acquired atrophy of thyroid    2. Dyslipidemia    3. Severe obesity (BMI 35.0-39.9) with comorbidity    4. Down syndrome    5. Encounter for long-term (current) use of medications          Plan:       Hypothyroidism due to acquired atrophy of thyroid  -     TSH; Future; Expected date: 07/23/2023    Dyslipidemia  -     Comprehensive Metabolic Panel; Future; Expected date: 07/23/2023  -     Lipid Panel; Future; Expected date: 07/23/2023    Severe obesity (BMI 35.0-39.9) with comorbidity    Down syndrome    Encounter for long-term (current) use of medications  -     Comprehensive Metabolic Panel; Future; Expected date: 07/23/2023  -     CBC Auto Differential; Future; Expected date: 07/23/2023            Continue current management and monitor.  Other diagnoses were reviewed and found stable and will continue to monitor.  Counseled on regular exercise, maintenance of a healthy weight, balanced diet rich in fruits/vegetables and lean protein, and avoidance of unhealthy habits like smoking and excessive alcohol intake.   Also, counseled on importance of being compliant with medication, health appointments, diet and exercise.     Follow up in about 6 months (around 7/24/2023).      Medication List with Changes/Refills   Current Medications    BIFIDOBACTERIUM INFANTIS (ALIGN ORAL)    Take by mouth.    BIOTIN ORAL    Take 1 tablet by mouth once daily.    ERGOCALCIFEROL, VITAMIN D2, (VITAMIN D2 ORAL)    Take by mouth once daily.    LEVOTHYROXINE (SYNTHROID) 25 MCG TABLET    TAKE 1 TABLET EVERY DAY    MULTIVIT WITH CALCIUM,IRON,MIN (MULTIPLE VITAMIN, WOMENS ORAL)    Take by mouth.     PRAVASTATIN (PRAVACHOL) 40 MG TABLET    TAKE 1 TABLET EVERY DAY       BP Readings from Last 3 Encounters:   07/21/22 108/72   07/20/21 118/76   03/29/21 122/70     Hemoglobin A1C   Date Value Ref Range Status   06/01/2017 5.8 4.5 - 6.2 % Final     Comment:     According to ADA guidelines, hemoglobin A1C <7.0% represents  optimal control in non-pregnant diabetic patients.  Different  metrics may apply to specific populations.   Standards of Medical Care in Diabetes - 2016.  For the purpose of screening for the presence of diabetes:  <5.7%     Consistent with the absence of diabetes  5.7-6.4%  Consistent with increasing risk for diabetes   (prediabetes)  >or=6.5%  Consistent with diabetes  Currently no consensus exists for use of hemoglobin A1C  for diagnosis of diabetes for children.     07/09/2013 5.7 4.0 - 6.2 % Final     Lab Results   Component Value Date    TSH 1.956 01/19/2023     Lab Results   Component Value Date    LDLCALC 122.2 01/19/2023    LDLCALC 120.2 07/16/2022    LDLCALC 99.8 08/10/2021     Lab Results   Component Value Date    TRIG 174 (H) 01/19/2023    TRIG 234 (H) 07/16/2022    TRIG 176 (H) 08/10/2021     Wt Readings from Last 3 Encounters:   01/24/23 61.5 kg (135 lb 9.3 oz)   12/02/22 62 kg (136 lb 11 oz)   07/21/22 62 kg (136 lb 11 oz)     Lab Results   Component Value Date    HGB 14.6 07/16/2022    HCT 44.6 07/16/2022    WBC 4.83 07/16/2022    ALT 18 01/19/2023    AST 27 01/19/2023     01/19/2023    K 4.1 01/19/2023    CREATININE 0.7 01/19/2023           Review of Systems        Objective:      Vitals:    01/24/23 1115   Pulse: 60     Physical Exam  Vitals reviewed.   Constitutional:       Appearance: Normal appearance.   Eyes:      Conjunctiva/sclera: Conjunctivae normal.   Cardiovascular:      Rate and Rhythm: Normal rate.   Pulmonary:      Effort: Pulmonary effort is normal.      Breath sounds: Normal breath sounds.   Musculoskeletal:      Cervical back: Normal range of motion.       Comments: Normal ROM bilateral    Skin:     General: Skin is warm and dry.   Neurological:      Mental Status: She is alert.      Cranial Nerves: Cranial nerve deficit: grossly intact.   Psychiatric:      Comments: Alert and orientated

## 2023-01-24 NOTE — TELEPHONE ENCOUNTER
Please, call and inform patient's mother:  Can use otc ranitidine or famotidine as needed.  Start with that and then let me know if this doesn't work.

## 2023-04-13 ENCOUNTER — OFFICE VISIT (OUTPATIENT)
Dept: OPTOMETRY | Facility: CLINIC | Age: 48
End: 2023-04-13
Payer: MEDICARE

## 2023-04-13 DIAGNOSIS — H25.13 NUCLEAR SCLEROSIS, BILATERAL: Primary | ICD-10-CM

## 2023-04-13 DIAGNOSIS — Z13.5 GLAUCOMA SCREENING: ICD-10-CM

## 2023-04-13 DIAGNOSIS — H20.813 HETEROCHROMIA OF IRIS OF BOTH EYES: ICD-10-CM

## 2023-04-13 DIAGNOSIS — H53.003 AMBLYOPIA, BILATERAL: ICD-10-CM

## 2023-04-13 DIAGNOSIS — H52.4 MYOPIA WITH ASTIGMATISM AND PRESBYOPIA, BILATERAL: ICD-10-CM

## 2023-04-13 DIAGNOSIS — H52.13 MYOPIA WITH ASTIGMATISM AND PRESBYOPIA, BILATERAL: ICD-10-CM

## 2023-04-13 DIAGNOSIS — H02.005 ENTROPION OF BOTH LOWER EYELIDS: ICD-10-CM

## 2023-04-13 DIAGNOSIS — H52.203 MYOPIA WITH ASTIGMATISM AND PRESBYOPIA, BILATERAL: ICD-10-CM

## 2023-04-13 DIAGNOSIS — H02.002 ENTROPION OF BOTH LOWER EYELIDS: ICD-10-CM

## 2023-04-13 PROCEDURE — 92014 PR EYE EXAM, EST PATIENT,COMPREHESV: ICD-10-PCS | Mod: HCNC,S$GLB,, | Performed by: OPTOMETRIST

## 2023-04-13 PROCEDURE — 1159F MED LIST DOCD IN RCRD: CPT | Mod: HCNC,CPTII,S$GLB, | Performed by: OPTOMETRIST

## 2023-04-13 PROCEDURE — 92014 COMPRE OPH EXAM EST PT 1/>: CPT | Mod: HCNC,S$GLB,, | Performed by: OPTOMETRIST

## 2023-04-13 PROCEDURE — 99999 PR PBB SHADOW E&M-EST. PATIENT-LVL II: ICD-10-PCS | Mod: PBBFAC,HCNC,, | Performed by: OPTOMETRIST

## 2023-04-13 PROCEDURE — 99999 PR PBB SHADOW E&M-EST. PATIENT-LVL II: CPT | Mod: PBBFAC,HCNC,, | Performed by: OPTOMETRIST

## 2023-04-13 PROCEDURE — 1159F PR MEDICATION LIST DOCUMENTED IN MEDICAL RECORD: ICD-10-PCS | Mod: HCNC,CPTII,S$GLB, | Performed by: OPTOMETRIST

## 2023-04-13 RX ORDER — INFLUENZA VIRUS VACCINE 15; 15; 15; 15 UG/.5ML; UG/.5ML; UG/.5ML; UG/.5ML
SUSPENSION INTRAMUSCULAR
COMMUNITY
Start: 2023-01-24 | End: 2023-08-01 | Stop reason: CLARIF

## 2023-04-13 NOTE — PROGRESS NOTES
HPI    Last DFE 12/15/21    Pt denies any blurred vision. Mom states pt squints when watching tc. Pt   denies any flashes or floaters.   Last edited by Latasha Lovelace on 4/13/2023 10:01 AM.            Assessment /Plan     For exam results, see Encounter Report.    Nuclear sclerosis, bilateral    Amblyopia, bilateral    Heterochromia of iris of both eyes    Glaucoma screening    Entropion of both lower eyelids    Myopia with astigmatism and presbyopia, bilateral      1. Early NS changes, not vis sig for consult   Monitor   2. Mild OU w/ early ref deprivation---bcva ~ 20/40-  3. Longstanding / stable  4. Not suspect  5. Mild entropion nasally OU, anatomy due to down's---aysmptomatic, monitor only   6. Updated and gave copy  Ok keep old specs  No new refraction     Discussed and educated patient on current findings /plan.  RTC 1 year, prn if any changes / issues

## 2023-07-25 ENCOUNTER — LAB VISIT (OUTPATIENT)
Dept: LAB | Facility: HOSPITAL | Age: 48
End: 2023-07-25
Attending: INTERNAL MEDICINE
Payer: MEDICARE

## 2023-07-25 DIAGNOSIS — E78.5 DYSLIPIDEMIA: ICD-10-CM

## 2023-07-25 DIAGNOSIS — E03.4 HYPOTHYROIDISM DUE TO ACQUIRED ATROPHY OF THYROID: ICD-10-CM

## 2023-07-25 DIAGNOSIS — Z79.899 ENCOUNTER FOR LONG-TERM (CURRENT) USE OF MEDICATIONS: ICD-10-CM

## 2023-07-25 LAB
ALBUMIN SERPL BCP-MCNC: 3.8 G/DL (ref 3.5–5.2)
ALP SERPL-CCNC: 80 U/L (ref 55–135)
ALT SERPL W/O P-5'-P-CCNC: 41 U/L (ref 10–44)
ANION GAP SERPL CALC-SCNC: 13 MMOL/L (ref 8–16)
AST SERPL-CCNC: 34 U/L (ref 10–40)
BASOPHILS # BLD AUTO: 0.11 K/UL (ref 0–0.2)
BASOPHILS NFR BLD: 2.5 % (ref 0–1.9)
BILIRUB SERPL-MCNC: 0.5 MG/DL (ref 0.1–1)
BUN SERPL-MCNC: 33 MG/DL (ref 6–20)
CALCIUM SERPL-MCNC: 9.3 MG/DL (ref 8.7–10.5)
CHLORIDE SERPL-SCNC: 105 MMOL/L (ref 95–110)
CHOLEST SERPL-MCNC: 247 MG/DL (ref 120–199)
CHOLEST/HDLC SERPL: 4.9 {RATIO} (ref 2–5)
CO2 SERPL-SCNC: 25 MMOL/L (ref 23–29)
CREAT SERPL-MCNC: 0.8 MG/DL (ref 0.5–1.4)
DIFFERENTIAL METHOD: ABNORMAL
EOSINOPHIL # BLD AUTO: 0.1 K/UL (ref 0–0.5)
EOSINOPHIL NFR BLD: 2 % (ref 0–8)
ERYTHROCYTE [DISTWIDTH] IN BLOOD BY AUTOMATED COUNT: 13.9 % (ref 11.5–14.5)
EST. GFR  (NO RACE VARIABLE): >60 ML/MIN/1.73 M^2
GLUCOSE SERPL-MCNC: 94 MG/DL (ref 70–110)
HCT VFR BLD AUTO: 43.8 % (ref 37–48.5)
HDLC SERPL-MCNC: 50 MG/DL (ref 40–75)
HDLC SERPL: 20.2 % (ref 20–50)
HGB BLD-MCNC: 14.4 G/DL (ref 12–16)
IMM GRANULOCYTES # BLD AUTO: 0.01 K/UL (ref 0–0.04)
IMM GRANULOCYTES NFR BLD AUTO: 0.2 % (ref 0–0.5)
LDLC SERPL CALC-MCNC: 154 MG/DL (ref 63–159)
LYMPHOCYTES # BLD AUTO: 1.5 K/UL (ref 1–4.8)
LYMPHOCYTES NFR BLD: 33.3 % (ref 18–48)
MCH RBC QN AUTO: 32.7 PG (ref 27–31)
MCHC RBC AUTO-ENTMCNC: 32.9 G/DL (ref 32–36)
MCV RBC AUTO: 99 FL (ref 82–98)
MONOCYTES # BLD AUTO: 0.5 K/UL (ref 0.3–1)
MONOCYTES NFR BLD: 10.1 % (ref 4–15)
NEUTROPHILS # BLD AUTO: 2.3 K/UL (ref 1.8–7.7)
NEUTROPHILS NFR BLD: 51.9 % (ref 38–73)
NONHDLC SERPL-MCNC: 197 MG/DL
NRBC BLD-RTO: 0 /100 WBC
PLATELET # BLD AUTO: 253 K/UL (ref 150–450)
PMV BLD AUTO: 10.4 FL (ref 9.2–12.9)
POTASSIUM SERPL-SCNC: 4.4 MMOL/L (ref 3.5–5.1)
PROT SERPL-MCNC: 7.2 G/DL (ref 6–8.4)
RBC # BLD AUTO: 4.41 M/UL (ref 4–5.4)
SODIUM SERPL-SCNC: 143 MMOL/L (ref 136–145)
TRIGL SERPL-MCNC: 215 MG/DL (ref 30–150)
TSH SERPL DL<=0.005 MIU/L-ACNC: 3.1 UIU/ML (ref 0.4–4)
WBC # BLD AUTO: 4.47 K/UL (ref 3.9–12.7)

## 2023-07-25 PROCEDURE — 80061 LIPID PANEL: CPT | Mod: HCNC | Performed by: INTERNAL MEDICINE

## 2023-07-25 PROCEDURE — 80053 COMPREHEN METABOLIC PANEL: CPT | Mod: HCNC | Performed by: INTERNAL MEDICINE

## 2023-07-25 PROCEDURE — 36415 COLL VENOUS BLD VENIPUNCTURE: CPT | Mod: HCNC,PO | Performed by: INTERNAL MEDICINE

## 2023-07-25 PROCEDURE — 85025 COMPLETE CBC W/AUTO DIFF WBC: CPT | Mod: HCNC | Performed by: INTERNAL MEDICINE

## 2023-07-25 PROCEDURE — 84443 ASSAY THYROID STIM HORMONE: CPT | Mod: HCNC | Performed by: INTERNAL MEDICINE

## 2023-08-01 ENCOUNTER — OFFICE VISIT (OUTPATIENT)
Dept: FAMILY MEDICINE | Facility: CLINIC | Age: 48
End: 2023-08-01
Payer: MEDICARE

## 2023-08-01 VITALS
HEART RATE: 61 BPM | SYSTOLIC BLOOD PRESSURE: 118 MMHG | HEIGHT: 55 IN | OXYGEN SATURATION: 96 % | WEIGHT: 132.5 LBS | DIASTOLIC BLOOD PRESSURE: 76 MMHG | BODY MASS INDEX: 30.66 KG/M2 | TEMPERATURE: 98 F

## 2023-08-01 DIAGNOSIS — Z79.899 ENCOUNTER FOR LONG-TERM (CURRENT) USE OF MEDICATIONS: ICD-10-CM

## 2023-08-01 DIAGNOSIS — E03.4 HYPOTHYROIDISM DUE TO ACQUIRED ATROPHY OF THYROID: ICD-10-CM

## 2023-08-01 DIAGNOSIS — Z12.31 ENCOUNTER FOR SCREENING MAMMOGRAM FOR BREAST CANCER: ICD-10-CM

## 2023-08-01 DIAGNOSIS — E78.5 DYSLIPIDEMIA: ICD-10-CM

## 2023-08-01 DIAGNOSIS — Z00.00 ROUTINE PHYSICAL EXAMINATION: Primary | ICD-10-CM

## 2023-08-01 DIAGNOSIS — Z12.11 SCREEN FOR COLON CANCER: ICD-10-CM

## 2023-08-01 PROCEDURE — 3008F PR BODY MASS INDEX (BMI) DOCUMENTED: ICD-10-PCS | Mod: HCNC,CPTII,S$GLB, | Performed by: INTERNAL MEDICINE

## 2023-08-01 PROCEDURE — 3074F PR MOST RECENT SYSTOLIC BLOOD PRESSURE < 130 MM HG: ICD-10-PCS | Mod: HCNC,CPTII,S$GLB, | Performed by: INTERNAL MEDICINE

## 2023-08-01 PROCEDURE — 99396 PREV VISIT EST AGE 40-64: CPT | Mod: HCNC,S$GLB,, | Performed by: INTERNAL MEDICINE

## 2023-08-01 PROCEDURE — 99999 PR PBB SHADOW E&M-EST. PATIENT-LVL III: ICD-10-PCS | Mod: PBBFAC,HCNC,, | Performed by: INTERNAL MEDICINE

## 2023-08-01 PROCEDURE — 1159F PR MEDICATION LIST DOCUMENTED IN MEDICAL RECORD: ICD-10-PCS | Mod: HCNC,CPTII,S$GLB, | Performed by: INTERNAL MEDICINE

## 2023-08-01 PROCEDURE — 3008F BODY MASS INDEX DOCD: CPT | Mod: HCNC,CPTII,S$GLB, | Performed by: INTERNAL MEDICINE

## 2023-08-01 PROCEDURE — 3078F PR MOST RECENT DIASTOLIC BLOOD PRESSURE < 80 MM HG: ICD-10-PCS | Mod: HCNC,CPTII,S$GLB, | Performed by: INTERNAL MEDICINE

## 2023-08-01 PROCEDURE — 1159F MED LIST DOCD IN RCRD: CPT | Mod: HCNC,CPTII,S$GLB, | Performed by: INTERNAL MEDICINE

## 2023-08-01 PROCEDURE — 99396 PR PREVENTIVE VISIT,EST,40-64: ICD-10-PCS | Mod: HCNC,S$GLB,, | Performed by: INTERNAL MEDICINE

## 2023-08-01 PROCEDURE — 3078F DIAST BP <80 MM HG: CPT | Mod: HCNC,CPTII,S$GLB, | Performed by: INTERNAL MEDICINE

## 2023-08-01 PROCEDURE — 3074F SYST BP LT 130 MM HG: CPT | Mod: HCNC,CPTII,S$GLB, | Performed by: INTERNAL MEDICINE

## 2023-08-01 PROCEDURE — 99999 PR PBB SHADOW E&M-EST. PATIENT-LVL III: CPT | Mod: PBBFAC,HCNC,, | Performed by: INTERNAL MEDICINE

## 2023-08-01 NOTE — PROGRESS NOTES
Subjective:       Patient ID: Ira Cooley is a 47 y.o. female.  Chief Complaint: Annual Exam     HPI    Here for routine health maintenance.      Down syndrome - lives with her mother who cares for her.  Exercising 4-5x per week with mother.    Obesity - eats poorly   Hypothryoid -  Controlled  HLD - uncontrolled LDL; high triglycerides    Assessment:       1. Routine physical examination    2. Screen for colon cancer    3. Encounter for screening mammogram for breast cancer    4. Dyslipidemia    5. Hypothyroidism due to acquired atrophy of thyroid    6. Encounter for long-term (current) use of medications        Plan:       Routine physical examination    Screen for colon cancer  -     Cologuard Screening (Multitarget Stool DNA); Future; Expected date: 08/01/2023    Encounter for screening mammogram for breast cancer  -     Mammo Digital Screening Bilat w/ Augustus; Future; Expected date: 08/25/2023    Dyslipidemia  -     Lipid Panel; Future; Expected date: 01/28/2024  -     Comprehensive Metabolic Panel; Future; Expected date: 01/28/2024    Hypothyroidism due to acquired atrophy of thyroid  -     TSH; Future; Expected date: 01/28/2024    Encounter for long-term (current) use of medications  -     Hemoglobin A1C; Future; Expected date: 01/28/2024          Wellness reviewed   Continue current management and monitor.  Other diagnoses were reviewed and found stable and will continue to monitor.  Counseled on regular exercise, maintenance of a healthy weight, balanced diet rich in fruits/vegetables and lean protein, and avoidance of unhealthy habits like smoking and excessive alcohol intake.   Also, counseled on importance of being compliant with medication, health appointments, diet and exercise.     Follow up in about 6 months (around 2/1/2024).      Medication List with Changes/Refills   Current Medications    BIFIDOBACTERIUM INFANTIS (ALIGN ORAL)    Take by mouth.    BIOTIN ORAL    Take 1 tablet by mouth once  daily.    ERGOCALCIFEROL, VITAMIN D2, (VITAMIN D2 ORAL)    Take by mouth once daily.    LEVOTHYROXINE (SYNTHROID) 25 MCG TABLET    TAKE 1 TABLET EVERY DAY    MULTIVIT WITH CALCIUM,IRON,MIN (MULTIPLE VITAMIN, WOMENS ORAL)    Take by mouth.    PRAVASTATIN (PRAVACHOL) 40 MG TABLET    TAKE 1 TABLET EVERY DAY   Discontinued Medications    FLUARIX QUAD 1812-0627, PF, 60 MCG (15 MCG X 4)/0.5 ML SYRG           BP Readings from Last 3 Encounters:   08/01/23 118/76   07/21/22 108/72   07/20/21 118/76     Hemoglobin A1C   Date Value Ref Range Status   06/01/2017 5.8 4.5 - 6.2 % Final     Comment:     According to ADA guidelines, hemoglobin A1C <7.0% represents  optimal control in non-pregnant diabetic patients.  Different  metrics may apply to specific populations.   Standards of Medical Care in Diabetes - 2016.  For the purpose of screening for the presence of diabetes:  <5.7%     Consistent with the absence of diabetes  5.7-6.4%  Consistent with increasing risk for diabetes   (prediabetes)  >or=6.5%  Consistent with diabetes  Currently no consensus exists for use of hemoglobin A1C  for diagnosis of diabetes for children.     07/09/2013 5.7 4.0 - 6.2 % Final     Lab Results   Component Value Date    TSH 3.098 07/25/2023     Lab Results   Component Value Date    LDLCALC 154.0 07/25/2023    LDLCALC 122.2 01/19/2023    LDLCALC 120.2 07/16/2022     Lab Results   Component Value Date    TRIG 215 (H) 07/25/2023    TRIG 174 (H) 01/19/2023    TRIG 234 (H) 07/16/2022     Wt Readings from Last 3 Encounters:   08/01/23 60.1 kg (132 lb 7.9 oz)   01/24/23 61.5 kg (135 lb 9.3 oz)   12/02/22 62 kg (136 lb 11 oz)     Lab Results   Component Value Date    HGB 14.4 07/25/2023    HCT 43.8 07/25/2023    WBC 4.47 07/25/2023    ALT 41 07/25/2023    AST 34 07/25/2023     07/25/2023    K 4.4 07/25/2023    CREATININE 0.8 07/25/2023           Review of Systems   Constitutional:  Negative for diaphoresis and fever.   HENT:  Negative for drooling  and nosebleeds.    Eyes:  Negative for discharge and redness.   Respiratory:  Negative for apnea and choking.    Cardiovascular:  Negative for chest pain and palpitations.   Gastrointestinal:  Negative for abdominal pain and nausea.   Skin:  Negative for color change.   Neurological:  Negative for seizures and syncope.   Psychiatric/Behavioral:  Negative for behavioral problems.            Objective:      Vitals:    08/01/23 1104   BP: 118/76   Pulse: 61   Temp: 97.6 °F (36.4 °C)     Physical Exam  Vitals reviewed.   Eyes:      Conjunctiva/sclera: Conjunctivae normal.   Neck:      Thyroid: No thyromegaly.      Trachea: Trachea normal.   Cardiovascular:      Rate and Rhythm: Normal rate and regular rhythm.      Comments: Edema negative  Pulmonary:      Effort: Pulmonary effort is normal.      Breath sounds: Normal breath sounds.   Abdominal:      General: Bowel sounds are normal.      Palpations: Abdomen is soft. There is no hepatomegaly.   Musculoskeletal:      Cervical back: Normal range of motion.      Comments: ROM normal bilateral  Strength normal bilateral   Skin:     General: Skin is warm and dry.   Neurological:      Mental Status: She is alert.      Deep Tendon Reflexes: Reflexes are normal and symmetric.      Comments: DTR decreased bilateral   Psychiatric:      Comments: Alert and Oriented

## 2023-08-16 DIAGNOSIS — E03.4 HYPOTHYROIDISM DUE TO ACQUIRED ATROPHY OF THYROID: ICD-10-CM

## 2023-08-16 RX ORDER — LEVOTHYROXINE SODIUM 25 UG/1
TABLET ORAL
Qty: 90 TABLET | Refills: 3 | Status: SHIPPED | OUTPATIENT
Start: 2023-08-16

## 2023-08-16 NOTE — TELEPHONE ENCOUNTER
Refill Decision Note   Ira Waenriqueta  is requesting a refill authorization.  Brief Assessment and Rationale for Refill:  Approve     Medication Therapy Plan:  MRM resolved      Comments:     Note composed:4:41 AM 08/16/2023

## 2023-08-16 NOTE — TELEPHONE ENCOUNTER
No care due was identified.  Health [facility] Embedded Care Due Messages. Reference number: [id number].   8/16/2023 12:02:23 AM CDT

## 2023-08-26 ENCOUNTER — HOSPITAL ENCOUNTER (OUTPATIENT)
Dept: RADIOLOGY | Facility: HOSPITAL | Age: 48
Discharge: HOME | End: 2023-08-26
Attending: INTERNAL MEDICINE
Payer: MEDICARE

## 2023-08-26 DIAGNOSIS — Z12.31 ENCOUNTER FOR SCREENING MAMMOGRAM FOR BREAST CANCER: ICD-10-CM

## 2023-08-26 PROCEDURE — 77067 MAMMO DIGITAL SCREENING BILAT WITH TOMO: ICD-10-PCS | Mod: 26,HCNC,, | Performed by: RADIOLOGY

## 2023-08-26 PROCEDURE — 77063 BREAST TOMOSYNTHESIS BI: CPT | Mod: 26,HCNC,, | Performed by: RADIOLOGY

## 2023-08-26 PROCEDURE — 77067 SCR MAMMO BI INCL CAD: CPT | Mod: 26,HCNC,, | Performed by: RADIOLOGY

## 2023-08-26 PROCEDURE — 77063 MAMMO DIGITAL SCREENING BILAT WITH TOMO: ICD-10-PCS | Mod: 26,HCNC,, | Performed by: RADIOLOGY

## 2023-08-26 PROCEDURE — 77067 SCR MAMMO BI INCL CAD: CPT | Mod: TC,HCNC,PO

## 2023-08-29 ENCOUNTER — TELEPHONE (OUTPATIENT)
Dept: FAMILY MEDICINE | Facility: CLINIC | Age: 48
End: 2023-08-29

## 2023-08-29 ENCOUNTER — TELEPHONE (OUTPATIENT)
Dept: FAMILY MEDICINE | Facility: CLINIC | Age: 48
End: 2023-08-29
Payer: MEDICARE

## 2023-08-29 NOTE — TELEPHONE ENCOUNTER
----- Message from Sebastien Pretty MD sent at 8/28/2023  9:27 AM CDT -----  Please, call and inform patient:   The images we took were normal.   If you have any further questions regarding these results, please contact me.

## 2023-11-16 ENCOUNTER — OFFICE VISIT (OUTPATIENT)
Dept: FAMILY MEDICINE | Facility: CLINIC | Age: 48
End: 2023-11-16
Payer: MEDICARE

## 2023-11-16 ENCOUNTER — HOSPITAL ENCOUNTER (OUTPATIENT)
Dept: RADIOLOGY | Facility: HOSPITAL | Age: 48
Discharge: HOME | End: 2023-11-16
Attending: NURSE PRACTITIONER
Payer: MEDICARE

## 2023-11-16 VITALS
HEIGHT: 55 IN | DIASTOLIC BLOOD PRESSURE: 70 MMHG | WEIGHT: 129.88 LBS | BODY MASS INDEX: 30.06 KG/M2 | HEART RATE: 79 BPM | SYSTOLIC BLOOD PRESSURE: 116 MMHG | OXYGEN SATURATION: 96 %

## 2023-11-16 DIAGNOSIS — R10.13 EPIGASTRIC PAIN: ICD-10-CM

## 2023-11-16 DIAGNOSIS — R63.0 DECREASED APPETITE: Primary | ICD-10-CM

## 2023-11-16 DIAGNOSIS — R09.81 SINUS CONGESTION: ICD-10-CM

## 2023-11-16 DIAGNOSIS — R63.0 DECREASED APPETITE: ICD-10-CM

## 2023-11-16 PROCEDURE — 99999 PR PBB SHADOW E&M-EST. PATIENT-LVL III: ICD-10-PCS | Mod: PBBFAC,HCNC,, | Performed by: NURSE PRACTITIONER

## 2023-11-16 PROCEDURE — 76700 US EXAM ABDOM COMPLETE: CPT | Mod: 26,HCNC,, | Performed by: RADIOLOGY

## 2023-11-16 PROCEDURE — 1159F MED LIST DOCD IN RCRD: CPT | Mod: HCNC,CPTII,S$GLB, | Performed by: NURSE PRACTITIONER

## 2023-11-16 PROCEDURE — 76700 US EXAM ABDOM COMPLETE: CPT | Mod: TC,HCNC,PO

## 2023-11-16 PROCEDURE — 99214 OFFICE O/P EST MOD 30 MIN: CPT | Mod: HCNC,S$GLB,, | Performed by: NURSE PRACTITIONER

## 2023-11-16 PROCEDURE — 76700 US ABDOMEN COMPLETE: ICD-10-PCS | Mod: 26,HCNC,, | Performed by: RADIOLOGY

## 2023-11-16 PROCEDURE — 3078F DIAST BP <80 MM HG: CPT | Mod: HCNC,CPTII,S$GLB, | Performed by: NURSE PRACTITIONER

## 2023-11-16 PROCEDURE — 3008F BODY MASS INDEX DOCD: CPT | Mod: HCNC,CPTII,S$GLB, | Performed by: NURSE PRACTITIONER

## 2023-11-16 PROCEDURE — 3074F PR MOST RECENT SYSTOLIC BLOOD PRESSURE < 130 MM HG: ICD-10-PCS | Mod: HCNC,CPTII,S$GLB, | Performed by: NURSE PRACTITIONER

## 2023-11-16 PROCEDURE — 3078F PR MOST RECENT DIASTOLIC BLOOD PRESSURE < 80 MM HG: ICD-10-PCS | Mod: HCNC,CPTII,S$GLB, | Performed by: NURSE PRACTITIONER

## 2023-11-16 PROCEDURE — 99214 PR OFFICE/OUTPT VISIT, EST, LEVL IV, 30-39 MIN: ICD-10-PCS | Mod: HCNC,S$GLB,, | Performed by: NURSE PRACTITIONER

## 2023-11-16 PROCEDURE — 3008F PR BODY MASS INDEX (BMI) DOCUMENTED: ICD-10-PCS | Mod: HCNC,CPTII,S$GLB, | Performed by: NURSE PRACTITIONER

## 2023-11-16 PROCEDURE — 1160F PR REVIEW ALL MEDS BY PRESCRIBER/CLIN PHARMACIST DOCUMENTED: ICD-10-PCS | Mod: HCNC,CPTII,S$GLB, | Performed by: NURSE PRACTITIONER

## 2023-11-16 PROCEDURE — 1160F RVW MEDS BY RX/DR IN RCRD: CPT | Mod: HCNC,CPTII,S$GLB, | Performed by: NURSE PRACTITIONER

## 2023-11-16 PROCEDURE — 3074F SYST BP LT 130 MM HG: CPT | Mod: HCNC,CPTII,S$GLB, | Performed by: NURSE PRACTITIONER

## 2023-11-16 PROCEDURE — 1159F PR MEDICATION LIST DOCUMENTED IN MEDICAL RECORD: ICD-10-PCS | Mod: HCNC,CPTII,S$GLB, | Performed by: NURSE PRACTITIONER

## 2023-11-16 PROCEDURE — 99999 PR PBB SHADOW E&M-EST. PATIENT-LVL III: CPT | Mod: PBBFAC,HCNC,, | Performed by: NURSE PRACTITIONER

## 2023-11-16 RX ORDER — BENZONATATE 200 MG/1
200 CAPSULE ORAL 3 TIMES DAILY PRN
Qty: 30 CAPSULE | Refills: 0 | Status: SHIPPED | OUTPATIENT
Start: 2023-11-16 | End: 2023-11-26

## 2023-11-16 RX ORDER — LEVOCETIRIZINE DIHYDROCHLORIDE 5 MG/1
5 TABLET, FILM COATED ORAL NIGHTLY
Qty: 30 TABLET | Refills: 11 | Status: SHIPPED | OUTPATIENT
Start: 2023-11-16 | End: 2024-11-15

## 2023-11-16 NOTE — PROGRESS NOTES
Subjective     Patient ID: Ira Cooley is a 48 y.o. female.    Chief Complaint: not eating    Patient is here with her mother, mother says patient is not eating as much as before, complains of heartburn at times and complains about her stomach hurting when she eats. This has been an ongoing issue, patient was supposed to have an egd 6-7 months ago but procedure was cancelled.   Mother says patient does have some post nasal drip and congestion daily.       Review of Systems   Constitutional:  Positive for appetite change.   Respiratory: Negative.     Cardiovascular: Negative.    Gastrointestinal:  Positive for abdominal pain and reflux. Negative for constipation, diarrhea, nausea and vomiting.          Objective     Physical Exam  Constitutional:       Appearance: Normal appearance.   HENT:      Head: Normocephalic and atraumatic.   Cardiovascular:      Rate and Rhythm: Normal rate and regular rhythm.      Heart sounds: No murmur heard.  Pulmonary:      Effort: Pulmonary effort is normal. No respiratory distress.      Breath sounds: Normal breath sounds.   Abdominal:      General: Bowel sounds are normal.      Tenderness: There is no abdominal tenderness. There is no guarding.   Neurological:      Mental Status: She is alert. Mental status is at baseline.   Psychiatric:         Mood and Affect: Mood normal.            Assessment and Plan     1. Decreased appetite  -     US Abdomen Complete; Future; Expected date: 11/16/2023    2. Epigastric pain  -     US Abdomen Complete; Future; Expected date: 11/16/2023    3. Sinus congestion  -     levocetirizine (XYZAL) 5 MG tablet; Take 1 tablet (5 mg total) by mouth every evening.  Dispense: 30 tablet; Refill: 11  -     benzonatate (TESSALON) 200 MG capsule; Take 1 capsule (200 mg total) by mouth 3 (three) times daily as needed for Cough.  Dispense: 30 capsule; Refill: 0        Refer to gastro for evaluation, abdominal us, treat allergc rhinitis symptoms as above.          No follow-ups on file.

## 2023-11-17 ENCOUNTER — TELEPHONE (OUTPATIENT)
Dept: FAMILY MEDICINE | Facility: CLINIC | Age: 48
End: 2023-11-17
Payer: MEDICARE

## 2023-11-17 NOTE — TELEPHONE ENCOUNTER
----- Message from Angeliaquincy Suad sent at 11/17/2023  2:57 PM CST -----  Regarding: Test Results  Contact: pt mother  Test Results      Who Called: Yessenia     Name of Test (Lab/Mammo/Etc):  US ABDOMEN [[id number]]    Date of Test: 11/16    Ordering Provider: Sukhwinder     Where the test was performed: [facility]    Would the patient rather a call back or a response via [facility]? Call back    Best Call Back Number:  [phone]      Additional Information:   Please Advise - Thank you

## 2023-11-17 NOTE — TELEPHONE ENCOUNTER
Patient calling for results advised it will take a lil longer to get once the results are resulted

## 2023-12-04 ENCOUNTER — PATIENT MESSAGE (OUTPATIENT)
Dept: ADMINISTRATIVE | Facility: HOSPITAL | Age: 48
End: 2023-12-04
Payer: MEDICARE

## 2024-01-04 ENCOUNTER — PATIENT MESSAGE (OUTPATIENT)
Dept: ADMINISTRATIVE | Facility: HOSPITAL | Age: 49
End: 2024-01-04
Payer: MEDICARE

## 2024-01-11 NOTE — TELEPHONE ENCOUNTER
No care due was identified.  Health [facility] Embedded Care Due Messages. Reference number: [id number].   1/11/2024 2:24:06 PM CST

## 2024-01-12 RX ORDER — PRAVASTATIN SODIUM 40 MG/1
TABLET ORAL
Qty: 90 TABLET | Refills: 1 | Status: SHIPPED | OUTPATIENT
Start: 2024-01-12

## 2024-01-12 NOTE — TELEPHONE ENCOUNTER
Refill Decision Note   Ira Cooley  is requesting a refill authorization.  Brief Assessment and Rationale for Refill:  Approve     Medication Therapy Plan:         Comments:     Note composed:6:08 AM 01/12/2024

## 2024-01-27 ENCOUNTER — LAB VISIT (OUTPATIENT)
Dept: LAB | Facility: HOSPITAL | Age: 49
End: 2024-01-27
Attending: INTERNAL MEDICINE
Payer: MEDICARE

## 2024-01-27 DIAGNOSIS — E03.4 HYPOTHYROIDISM DUE TO ACQUIRED ATROPHY OF THYROID: ICD-10-CM

## 2024-01-27 DIAGNOSIS — Z79.899 ENCOUNTER FOR LONG-TERM (CURRENT) USE OF MEDICATIONS: ICD-10-CM

## 2024-01-27 DIAGNOSIS — E78.5 DYSLIPIDEMIA: ICD-10-CM

## 2024-01-27 LAB
ALBUMIN SERPL BCP-MCNC: 3.7 G/DL (ref 3.5–5.2)
ALP SERPL-CCNC: 102 U/L (ref 55–135)
ALT SERPL W/O P-5'-P-CCNC: 27 U/L (ref 10–44)
ANION GAP SERPL CALC-SCNC: 11 MMOL/L (ref 8–16)
AST SERPL-CCNC: 28 U/L (ref 10–40)
BILIRUB SERPL-MCNC: 0.6 MG/DL (ref 0.1–1)
BUN SERPL-MCNC: 12 MG/DL (ref 6–20)
CALCIUM SERPL-MCNC: 10 MG/DL (ref 8.7–10.5)
CHLORIDE SERPL-SCNC: 104 MMOL/L (ref 95–110)
CHOLEST SERPL-MCNC: 231 MG/DL (ref 120–199)
CHOLEST/HDLC SERPL: 4.9 {RATIO} (ref 2–5)
CO2 SERPL-SCNC: 25 MMOL/L (ref 23–29)
CREAT SERPL-MCNC: 0.8 MG/DL (ref 0.5–1.4)
EST. GFR  (NO RACE VARIABLE): >60 ML/MIN/1.73 M^2
ESTIMATED AVG GLUCOSE: 114 MG/DL (ref 68–131)
GLUCOSE SERPL-MCNC: 92 MG/DL (ref 70–110)
HBA1C MFR BLD: 5.6 % (ref 4–5.6)
HDLC SERPL-MCNC: 47 MG/DL (ref 40–75)
HDLC SERPL: 20.3 % (ref 20–50)
LDLC SERPL CALC-MCNC: 118.4 MG/DL (ref 63–159)
NONHDLC SERPL-MCNC: 184 MG/DL
POTASSIUM SERPL-SCNC: 4.1 MMOL/L (ref 3.5–5.1)
PROT SERPL-MCNC: 7.1 G/DL (ref 6–8.4)
SODIUM SERPL-SCNC: 140 MMOL/L (ref 136–145)
T4 FREE SERPL-MCNC: 0.8 NG/DL (ref 0.71–1.51)
TRIGL SERPL-MCNC: 328 MG/DL (ref 30–150)
TSH SERPL DL<=0.005 MIU/L-ACNC: 4.01 UIU/ML (ref 0.4–4)

## 2024-01-27 PROCEDURE — 36415 COLL VENOUS BLD VENIPUNCTURE: CPT | Mod: HCNC,PO | Performed by: INTERNAL MEDICINE

## 2024-01-27 PROCEDURE — 80053 COMPREHEN METABOLIC PANEL: CPT | Mod: HCNC | Performed by: INTERNAL MEDICINE

## 2024-01-27 PROCEDURE — 84439 ASSAY OF FREE THYROXINE: CPT | Mod: HCNC | Performed by: INTERNAL MEDICINE

## 2024-01-27 PROCEDURE — 80061 LIPID PANEL: CPT | Mod: HCNC | Performed by: INTERNAL MEDICINE

## 2024-01-27 PROCEDURE — 84443 ASSAY THYROID STIM HORMONE: CPT | Mod: HCNC | Performed by: INTERNAL MEDICINE

## 2024-01-27 PROCEDURE — 83036 HEMOGLOBIN GLYCOSYLATED A1C: CPT | Mod: HCNC | Performed by: INTERNAL MEDICINE

## 2024-02-01 ENCOUNTER — OFFICE VISIT (OUTPATIENT)
Dept: FAMILY MEDICINE | Facility: CLINIC | Age: 49
End: 2024-02-01
Payer: MEDICARE

## 2024-02-01 VITALS
TEMPERATURE: 98 F | BODY MASS INDEX: 29.29 KG/M2 | SYSTOLIC BLOOD PRESSURE: 108 MMHG | DIASTOLIC BLOOD PRESSURE: 82 MMHG | HEART RATE: 84 BPM | OXYGEN SATURATION: 95 % | HEIGHT: 55 IN | WEIGHT: 126.56 LBS

## 2024-02-01 DIAGNOSIS — Q90.9 DOWN SYNDROME: Primary | ICD-10-CM

## 2024-02-01 DIAGNOSIS — E78.2 MIXED HYPERLIPIDEMIA: ICD-10-CM

## 2024-02-01 DIAGNOSIS — E03.4 HYPOTHYROIDISM DUE TO ACQUIRED ATROPHY OF THYROID: ICD-10-CM

## 2024-02-01 DIAGNOSIS — E66.01 SEVERE OBESITY (BMI 35.0-39.9) WITH COMORBIDITY: ICD-10-CM

## 2024-02-01 DIAGNOSIS — K21.9 GERD WITHOUT ESOPHAGITIS: Chronic | ICD-10-CM

## 2024-02-01 PROCEDURE — 99214 OFFICE O/P EST MOD 30 MIN: CPT | Mod: HCNC,S$GLB,, | Performed by: NURSE PRACTITIONER

## 2024-02-01 PROCEDURE — 3008F BODY MASS INDEX DOCD: CPT | Mod: HCNC,CPTII,S$GLB, | Performed by: NURSE PRACTITIONER

## 2024-02-01 PROCEDURE — 1159F MED LIST DOCD IN RCRD: CPT | Mod: HCNC,CPTII,S$GLB, | Performed by: NURSE PRACTITIONER

## 2024-02-01 PROCEDURE — 3079F DIAST BP 80-89 MM HG: CPT | Mod: HCNC,CPTII,S$GLB, | Performed by: NURSE PRACTITIONER

## 2024-02-01 PROCEDURE — 99999 PR PBB SHADOW E&M-EST. PATIENT-LVL IV: CPT | Mod: PBBFAC,HCNC,, | Performed by: NURSE PRACTITIONER

## 2024-02-01 PROCEDURE — 3044F HG A1C LEVEL LT 7.0%: CPT | Mod: HCNC,CPTII,S$GLB, | Performed by: NURSE PRACTITIONER

## 2024-02-01 PROCEDURE — 3074F SYST BP LT 130 MM HG: CPT | Mod: HCNC,CPTII,S$GLB, | Performed by: NURSE PRACTITIONER

## 2024-02-01 NOTE — PROGRESS NOTES
Subjective:       Patient ID: Ira Cooley is a 48 y.o. female.    Chief Complaint: Annual Exam    HPI  Here for routine health maintenance.       Down syndrome - lives with her mother who cares for her.  Exercising 4-5x per week with mother.    Obesity - eats poorly   Hypothryoid -  Controlled  HLD -  high triglycerides    Seeing GI tomorrow for abdominal pain     Colon cancer screening: mother concerned she wont complete prep for colonoscopy.     Vitals:    02/01/24 1125   BP: 108/82   Pulse: 84   Temp: 97.9 °F (36.6 °C)     Review of Systems   Constitutional:  Negative for fever.   Respiratory:  Negative for cough and shortness of breath.    Cardiovascular:  Negative for chest pain.       Past Medical History:   Diagnosis Date    GERD (gastroesophageal reflux disease)     Hypercholesteremia     Menopause 2017    Thyroid activity decreased      Objective:      Physical Exam  Constitutional:       General: She is not in acute distress.     Appearance: She is well-developed. She is obese. She is not ill-appearing, toxic-appearing or diaphoretic.   HENT:      Right Ear: Hearing normal.      Left Ear: Hearing normal.   Cardiovascular:      Rate and Rhythm: Normal rate and regular rhythm.      Heart sounds: Normal heart sounds.   Pulmonary:      Effort: No tachypnea or respiratory distress.   Musculoskeletal:      Right lower leg: No edema.      Left lower leg: No edema.   Skin:     Coloration: Skin is not pale.   Neurological:      Mental Status: She is alert and oriented to person, place, and time.   Psychiatric:         Speech: Speech normal.         Behavior: Behavior normal.         Thought Content: Thought content normal.         Judgment: Judgment normal.         Assessment:       1. Down syndrome    2. GERD without esophagitis    3. Hypothyroidism due to acquired atrophy of thyroid    4. Mixed hyperlipidemia    5. Severe obesity (BMI 35.0-39.9) with comorbidity        Plan:       Down syndrome    GERD  without esophagitis    Hypothyroidism due to acquired atrophy of thyroid  -     TSH; Future; Expected date: 02/01/2024    Mixed hyperlipidemia  -     Comprehensive Metabolic Panel; Future; Expected date: 02/01/2024  -     Lipid Panel; Future; Expected date: 07/01/2024  -     CBC Auto Differential; Future; Expected date: 02/01/2024    Severe obesity (BMI 35.0-39.9) with comorbidity      Continue exercising; discussed diet adjustments for management of hypertriglyceridemia    FU 6 months with PCP labs prior         Medication List with Changes/Refills   Current Medications    BIFIDOBACTERIUM INFANTIS (ALIGN ORAL)    Take by mouth.    BIOTIN ORAL    Take 1 tablet by mouth once daily.    ERGOCALCIFEROL, VITAMIN D2, (VITAMIN D2 ORAL)    Take by mouth once daily.    LEVOCETIRIZINE (XYZAL) 5 MG TABLET    Take 1 tablet (5 mg total) by mouth every evening.    LEVOTHYROXINE (SYNTHROID) 25 MCG TABLET    TAKE 1 TABLET EVERY DAY    MULTIVIT WITH CALCIUM,IRON,MIN (MULTIPLE VITAMIN, WOMENS ORAL)    Take by mouth.    PRAVASTATIN (PRAVACHOL) 40 MG TABLET    TAKE 1 TABLET EVERY DAY

## 2024-02-02 ENCOUNTER — OFFICE VISIT (OUTPATIENT)
Dept: GASTROENTEROLOGY | Facility: CLINIC | Age: 49
End: 2024-02-02
Payer: MEDICARE

## 2024-02-02 VITALS — HEIGHT: 55 IN | BODY MASS INDEX: 35.59 KG/M2

## 2024-02-02 DIAGNOSIS — R10.13 EPIGASTRIC PAIN: Primary | ICD-10-CM

## 2024-02-02 DIAGNOSIS — Z12.11 SCREENING FOR COLON CANCER: ICD-10-CM

## 2024-02-02 DIAGNOSIS — R12 HEARTBURN: ICD-10-CM

## 2024-02-02 PROCEDURE — 99999 PR PBB SHADOW E&M-EST. PATIENT-LVL III: CPT | Mod: PBBFAC,HCNC,, | Performed by: NURSE PRACTITIONER

## 2024-02-02 PROCEDURE — 99214 OFFICE O/P EST MOD 30 MIN: CPT | Mod: HCNC,S$GLB,, | Performed by: NURSE PRACTITIONER

## 2024-02-02 PROCEDURE — 1159F MED LIST DOCD IN RCRD: CPT | Mod: HCNC,CPTII,S$GLB, | Performed by: NURSE PRACTITIONER

## 2024-02-02 PROCEDURE — 3044F HG A1C LEVEL LT 7.0%: CPT | Mod: HCNC,CPTII,S$GLB, | Performed by: NURSE PRACTITIONER

## 2024-02-02 PROCEDURE — 3008F BODY MASS INDEX DOCD: CPT | Mod: HCNC,CPTII,S$GLB, | Performed by: NURSE PRACTITIONER

## 2024-02-02 PROCEDURE — 1160F RVW MEDS BY RX/DR IN RCRD: CPT | Mod: HCNC,CPTII,S$GLB, | Performed by: NURSE PRACTITIONER

## 2024-02-02 RX ORDER — OMEPRAZOLE 20 MG/1
20 CAPSULE, DELAYED RELEASE ORAL DAILY
Qty: 90 CAPSULE | Refills: 0 | Status: SHIPPED | OUTPATIENT
Start: 2024-02-02 | End: 2024-04-29 | Stop reason: SDUPTHER

## 2024-02-02 NOTE — PROGRESS NOTES
Subjective:       Patient ID: Ira Cooley is a 48 y.o. female, Body mass index is 35.59 kg/m².    Chief Complaint: Abdominal Pain      Patient is new to me Referred by Dayanara Cristobal NP for epigastric pain.    Here with mother, whom assisted with interview.    Abdominal Pain  This is a chronic problem. The current episode started more than 1 month ago. The onset quality is sudden. The problem occurs intermittently. The problem has been unchanged. The pain is located in the epigastric region. The quality of the pain is aching. The abdominal pain does not radiate. Pertinent negatives include no anorexia, belching, constipation, diarrhea, dysuria, fever, flatus, frequency, hematochezia, melena, nausea, vomiting or weight loss. Associated symptoms comments: Associated symptoms include heartburn. Nothing aggravates the pain. The pain is relieved by Nothing. She has tried nothing for the symptoms. Prior diagnostic workup includes GI consult and ultrasound. Her past medical history is significant for GERD. There is no history of abdominal surgery, colon cancer, Crohn's disease, gallstones, irritable bowel syndrome, pancreatitis, PUD or ulcerative colitis.     Review of Systems   Constitutional:  Negative for appetite change, fever, unexpected weight change and weight loss.   HENT:  Negative for trouble swallowing.    Respiratory:  Negative for cough and shortness of breath.    Cardiovascular:  Negative for chest pain.   Gastrointestinal:  Positive for abdominal pain. Negative for abdominal distention, anal bleeding, anorexia, blood in stool, constipation, diarrhea, flatus, hematochezia, melena, nausea, rectal pain and vomiting.   Genitourinary:  Negative for difficulty urinating, dysuria and frequency.   Musculoskeletal:  Negative for gait problem.   Skin:  Negative for rash.   Neurological:  Negative for speech difficulty.   Psychiatric/Behavioral:  Negative for confusion.        Past Medical History:   Diagnosis Date     GERD (gastroesophageal reflux disease)     Hypercholesteremia     Menopause 2017    Thyroid activity decreased       Past Surgical History:   Procedure Laterality Date    TONSILLECTOMY        Family History   Problem Relation Age of Onset    Cataracts Maternal Aunt     Diabetes Maternal Aunt     Stroke Maternal Aunt     Thyroid disease Maternal Aunt     Cancer Maternal Aunt     Cancer Maternal Uncle     Cancer Maternal Grandmother     Cancer Maternal Grandfather     Glaucoma Neg Hx       Wt Readings from Last 10 Encounters:   02/01/24 57.4 kg (126 lb 8.7 oz)   11/16/23 58.9 kg (129 lb 13.6 oz)   08/01/23 60.1 kg (132 lb 7.9 oz)   01/24/23 61.5 kg (135 lb 9.3 oz)   12/02/22 62 kg (136 lb 11 oz)   07/21/22 62 kg (136 lb 11 oz)   12/01/21 60.9 kg (134 lb 4.2 oz)   07/20/21 60.9 kg (134 lb 4.2 oz)   03/29/21 60.9 kg (134 lb 4.2 oz)   08/05/20 63.4 kg (139 lb 12.4 oz)     Lab Results   Component Value Date    WBC 4.47 07/25/2023    HGB 14.4 07/25/2023    HCT 43.8 07/25/2023    MCV 99 (H) 07/25/2023     07/25/2023     CMP  Sodium   Date Value Ref Range Status   01/27/2024 140 136 - 145 mmol/L Final     Potassium   Date Value Ref Range Status   01/27/2024 4.1 3.5 - 5.1 mmol/L Final     Chloride   Date Value Ref Range Status   01/27/2024 104 95 - 110 mmol/L Final     CO2   Date Value Ref Range Status   01/27/2024 25 23 - 29 mmol/L Final     Glucose   Date Value Ref Range Status   01/27/2024 92 70 - 110 mg/dL Final     BUN   Date Value Ref Range Status   01/27/2024 12 6 - 20 mg/dL Final     Creatinine   Date Value Ref Range Status   01/27/2024 0.8 0.5 - 1.4 mg/dL Final     Calcium   Date Value Ref Range Status   01/27/2024 10.0 8.7 - 10.5 mg/dL Final     Total Protein   Date Value Ref Range Status   01/27/2024 7.1 6.0 - 8.4 g/dL Final     Albumin   Date Value Ref Range Status   01/27/2024 3.7 3.5 - 5.2 g/dL Final     Total Bilirubin   Date Value Ref Range Status   01/27/2024 0.6 0.1 - 1.0 mg/dL Final     Comment:      For infants and newborns, interpretation of results should be based  on gestational age, weight and in agreement with clinical  observations.    Premature Infant recommended reference ranges:  Up to 24 hours.............<8.0 mg/dL  Up to 48 hours............<12.0 mg/dL  3-5 days..................<15.0 mg/dL  6-29 days.................<15.0 mg/dL       Alkaline Phosphatase   Date Value Ref Range Status   01/27/2024 102 55 - 135 U/L Final     AST   Date Value Ref Range Status   01/27/2024 28 10 - 40 U/L Final     ALT   Date Value Ref Range Status   01/27/2024 27 10 - 44 U/L Final     Anion Gap   Date Value Ref Range Status   01/27/2024 11 8 - 16 mmol/L Final     eGFR if    Date Value Ref Range Status   07/16/2022 >60.0 >60 mL/min/1.73 m^2 Final     eGFR if non    Date Value Ref Range Status   07/16/2022 >60.0 >60 mL/min/1.73 m^2 Final     Comment:     Calculation used to obtain the estimated glomerular filtration  rate (eGFR) is the CKD-EPI equation.                 Reviewed prior medical records including radiology report of US of abdomen 11/16/23 & endoscopy history (see surgical history).     Objective:      Physical Exam  Constitutional:       General: She is not in acute distress.     Appearance: She is well-developed.   HENT:      Head: Normocephalic.      Right Ear: Hearing normal.      Left Ear: Hearing normal.      Nose: Nose normal.      Mouth/Throat:      Mouth: No oral lesions.      Pharynx: Uvula midline.   Eyes:      General: Lids are normal.      Conjunctiva/sclera: Conjunctivae normal.      Pupils: Pupils are equal, round, and reactive to light.   Neck:      Trachea: Trachea normal.   Cardiovascular:      Rate and Rhythm: Normal rate and regular rhythm.      Heart sounds: Normal heart sounds. No murmur heard.  Pulmonary:      Effort: Pulmonary effort is normal. No respiratory distress.      Breath sounds: Normal breath sounds. No stridor. No wheezing.   Abdominal:       General: Bowel sounds are normal. There is no distension.      Palpations: Abdomen is soft. There is no mass.      Tenderness: There is no abdominal tenderness. There is no guarding or rebound.   Musculoskeletal:         General: Normal range of motion.      Cervical back: Normal range of motion.   Skin:     General: Skin is warm and dry.      Findings: No rash.      Comments: Non jaundiced   Neurological:      Mental Status: She is alert and oriented to person, place, and time.   Psychiatric:         Speech: Speech normal.         Behavior: Behavior normal. Behavior is cooperative.           Assessment:       1. Epigastric pain    2. Heartburn    3. Screening for colon cancer           Plan:   All diagnoses and orders for this visit:    Epigastric pain & Heartburn  - Schedule EGD   - Start: omeprazole (PRILOSEC) 20 MG capsule; Take 1 capsule (20 mg total) by mouth once daily.  Dispense: 90 capsule; Refill: 0  - Take PPI in the morning 30 minutes before breakfast  - Recommend to avoid large meals, avoid eating within 3 hours of bedtime, elevate head of bed if nocturnal symptoms are present, smoking cessation (if current smoker), & weight loss (if overweight).   - Recommend minimize/avoid high-fat foods, chocolate, caffeine, citrus, alcohol, & tomato products.  - Advised to avoid/limit use of NSAID's, since they can cause GI upset, bleeding, and/or ulcers. If needed, take with food.      Screening for colon cancer   - Discussed about Colonoscopy, including the risks and benefits of colonoscopy, patient verbalized understanding but patient declined colonoscopy.     If no improvement in symptoms or symptoms worsen, call/follow-up at clinic or go to ER

## 2024-04-05 ENCOUNTER — TELEPHONE (OUTPATIENT)
Dept: GASTROENTEROLOGY | Facility: CLINIC | Age: 49
End: 2024-04-05
Payer: MEDICARE

## 2024-04-05 NOTE — TELEPHONE ENCOUNTER
----- Message from Shannan Silva sent at 4/5/2024  3:55 PM CDT -----  Contact: Mom  Type:  Needs Medical Advice    Who Called: Mom Yessenia  Symptoms (please be specific): Mom is requesting nurse give her a call concerning a message she received via portal.  Mom sts it might be dealing with her procedure she has coming up.   Would the patient rather a call back or a response via [facility]? call  Best Call Back Number: [phone] (home)     Additional Information: please advise and thank you

## 2024-04-05 NOTE — TELEPHONE ENCOUNTER
Returned call to patient's mom, mom states that patient got a message on my chart and they do not know who it was from, No message found from our clinic, this was reported to patient's mom, mom verbalized understanding of this.

## 2024-04-11 ENCOUNTER — PATIENT MESSAGE (OUTPATIENT)
Dept: ADMINISTRATIVE | Facility: HOSPITAL | Age: 49
End: 2024-04-11
Payer: MEDICARE

## 2024-04-28 NOTE — H&P
History & Physical - Short Stay  Gastroenterology      SUBJECTIVE:     Procedure: Gastroscopy    Chief Complaint/Indication for Procedure:  Epig pain.  Heartburn.    History of Present Illness:  See recent GI OV note:  Office Visit  2/2/2024  [place] - Gastroenterology       Payal Blue NP  Gastroenterology Epigastric pain +2 more  Dx Abdominal Pain; Referred by Self, Aaareferral  Reason for Visit     Progress Notes    Payal Blue NP at 2/2/2024  9:00 AM    Status: Signed   Expand All Collapse All  Subjective:         Subjective  Patient ID: Ira Cooley is a 48 y.o. female, Body mass index is 35.59 kg/m².     Chief Complaint: Abdominal Pain        Patient is new to me Referred by Dayanara Cristobal NP for epigastric pain.     Here with mother, whom assisted with interview.     Abdominal Pain  This is a chronic problem. The current episode started more than 1 month ago. The onset quality is sudden. The problem occurs intermittently. The problem has been unchanged. The pain is located in the epigastric region. The quality of the pain is aching. The abdominal pain does not radiate. Pertinent negatives include no anorexia, belching, constipation, diarrhea, dysuria, fever, flatus, frequency, hematochezia, melena, nausea, vomiting or weight loss. Associated symptoms comments: Associated symptoms include heartburn. Nothing aggravates the pain. The pain is relieved by Nothing. She has tried nothing for the symptoms. Prior diagnostic workup includes GI consult and ultrasound. Her past medical history is significant for GERD. There is no history of abdominal surgery, colon cancer, Crohn's disease, gallstones, irritable bowel syndrome, pancreatitis, PUD or ulcerative colitis.      Review of Systems   Constitutional:  Negative for appetite change, fever, unexpected weight change and weight loss.   HENT:  Negative for trouble swallowing.    Respiratory:  Negative for cough and shortness of breath.    Cardiovascular:   Negative for chest pain.   Gastrointestinal:  Positive for abdominal pain. Negative for abdominal distention, anal bleeding, anorexia, blood in stool, constipation, diarrhea, flatus, hematochezia, melena, nausea, rectal pain and vomiting.       Assessment:      Assessment  1. Epigastric pain    2. Heartburn    3. Screening for colon cancer             Plan:   All diagnoses and orders for this visit:     Epigastric pain & Heartburn  - Schedule EGD   - Start: omeprazole (PRILOSEC) 20 MG capsule; Take 1 capsule (20 mg total) by mouth once daily.  Dispense: 90 capsule; Refill: 0  - Take PPI in the morning 30 minutes before breakfast  - Recommend to avoid large meals, avoid eating within 3 hours of bedtime, elevate head of bed if nocturnal symptoms are present, smoking cessation (if current smoker), & weight loss (if overweight).   - Recommend minimize/avoid high-fat foods, chocolate, caffeine, citrus, alcohol, & tomato products.  - Advised to avoid/limit use of NSAID's, since they can cause GI upset, bleeding, and/or ulcers. If needed, take with food.       Screening for colon cancer              - Discussed about Colonoscopy, including the risks and benefits of colonoscopy, patient verbalized understanding but patient declined colonoscopy.               Current Facility-Administered Medications   Medication Dose Route Frequency Provider Last Rate Last Admin    lactated ringers infusion   Intravenous Continuous Adam Pinon Jr., MD 75 mL/hr at 04/29/24 1006 New Bag at 04/29/24 1006    sodium chloride 0.9% flush 10 mL  10 mL Intravenous PRN Adam Pinon Jr., MD         Facility-Administered Medications Ordered in Other Encounters   Medication Dose Route Frequency Provider Last Rate Last Admin    glycopyrrolate (PF) injection   Intravenous PRN Lisette Newman CRNA   0.2 mg at 04/29/24 1042         Current Outpatient Medications   Medication Sig Dispense Refill    Bifidobacterium infantis (ALIGN ORAL) Take by  "mouth.      ERGOCALCIFEROL, VITAMIN D2, (VITAMIN D2 ORAL) Take by mouth once daily.      levothyroxine (SYNTHROID) 25 MCG tablet TAKE 1 TABLET EVERY DAY 90 tablet 3    MULTIVIT WITH CALCIUM,IRON,MIN (MULTIPLE VITAMIN, WOMENS ORAL) Take by mouth.      omeprazole (PRILOSEC) 20 MG capsule Take 1 capsule (20 mg total) by mouth once daily. 90 capsule 0    pravastatin (PRAVACHOL) 40 MG tablet TAKE 1 TABLET EVERY DAY 90 tablet 1    BIOTIN ORAL Take 1 tablet by mouth once daily. (Patient not taking: Reported on 4/22/2024)      levocetirizine (XYZAL) 5 MG tablet Take 1 tablet (5 mg total) by mouth every evening. (Patient not taking: Reported on 4/22/2024) 30 tablet 11       Review of patient's allergies indicates:   Allergen Reactions    Adacel (adolescent & adult) [diph,pertuss(acel),tet vac(pf)] Other (See Comments)     Redness, pain, and induration at injection site.        Past Medical History:   Diagnosis Date    Down syndrome     GERD (gastroesophageal reflux disease)     Hyperlipidemia     Hypothyroidism     Menopause 2017    Severe obesity (BMI 35.0-39.9) with comorbidity      Past Surgical History:   Procedure Laterality Date    TONSILLECTOMY       Family History   Problem Relation Name Age of Onset    Cataracts Maternal Aunt      Diabetes Maternal Aunt      Stroke Maternal Aunt      Thyroid disease Maternal Aunt      Cancer Maternal Aunt      Cancer Maternal Uncle      Cancer Maternal Grandmother      Cancer Maternal Grandfather      Glaucoma Neg Hx       Social History     Tobacco Use    Smoking status: Never    Smokeless tobacco: Never   Substance Use Topics    Alcohol use: No    Drug use: No         OBJECTIVE:     Vital Signs (Most Recent)  Temp: 97.8 °F (36.6 °C) (04/29/24 1007)  Pulse: (!) 53 (04/29/24 1007)  Resp: 16 (04/29/24 1007)  BP: (!) 104/57 (04/29/24 1007)  SpO2: 95 % (04/29/24 1007)    Physical Exam:  : Ht: 4' 2" (127 cm)   Wt: 59 kg   BMI: 36.56 kg/m² .                                                "        GENERAL:  Comfortable, in no acute distress.                                 HEENT EXAM:  Nonicteric.  No adenopathy.  Oropharynx is clear.               NECK:  Supple.                                                               LUNGS:  Clear.                                                               CARDIAC:  Regular rate and rhythm.  S1, S2.  No murmur.                      ABDOMEN:  Obese.  Soft, positive bowel sounds, nontender.  No hepatosplenomegaly or masses.  No rebound or guarding.                                             EXTREMITIES:  No edema.     MENTAL STATUS:  Alert and oriented.    ASSESSMENT/PLAN:     Assessment:  Epig pain.  Heartburn.  Obesity.    Plan: Gastroscopy    Anesthesia Plan:   MAC / General Anaesthesia    ASA Grade: ASA 2 - Patient with mild systemic disease with no functional limitations    MALLAMPATI SCORE: II (hard and soft palate, upper portion of tonsils anduvula visible)

## 2024-04-29 ENCOUNTER — ANESTHESIA (OUTPATIENT)
Dept: ENDOSCOPY | Facility: HOSPITAL | Age: 49
End: 2024-04-29
Payer: MEDICARE

## 2024-04-29 ENCOUNTER — ANESTHESIA EVENT (OUTPATIENT)
Dept: ENDOSCOPY | Facility: HOSPITAL | Age: 49
End: 2024-04-29
Payer: MEDICARE

## 2024-04-29 ENCOUNTER — HOSPITAL ENCOUNTER (OUTPATIENT)
Facility: HOSPITAL | Age: 49
Discharge: HOME | End: 2024-04-29
Attending: INTERNAL MEDICINE | Admitting: INTERNAL MEDICINE
Payer: MEDICARE

## 2024-04-29 VITALS
HEIGHT: 55 IN | SYSTOLIC BLOOD PRESSURE: 120 MMHG | DIASTOLIC BLOOD PRESSURE: 67 MMHG | WEIGHT: 130 LBS | TEMPERATURE: 98 F | RESPIRATION RATE: 15 BRPM | BODY MASS INDEX: 30.09 KG/M2 | HEART RATE: 75 BPM | OXYGEN SATURATION: 100 %

## 2024-04-29 DIAGNOSIS — R10.13 EPIGASTRIC PAIN: ICD-10-CM

## 2024-04-29 DIAGNOSIS — R12 HEARTBURN: ICD-10-CM

## 2024-04-29 PROCEDURE — 88305 TISSUE EXAM BY PATHOLOGIST: CPT | Mod: HCNC,PO | Performed by: PATHOLOGY

## 2024-04-29 PROCEDURE — 43239 EGD BIOPSY SINGLE/MULTIPLE: CPT | Mod: HCNC,PO | Performed by: INTERNAL MEDICINE

## 2024-04-29 PROCEDURE — 25000003 PHARM REV CODE 250: Mod: HCNC,PO | Performed by: NURSE ANESTHETIST, CERTIFIED REGISTERED

## 2024-04-29 PROCEDURE — 43239 EGD BIOPSY SINGLE/MULTIPLE: CPT | Mod: HCNC,,, | Performed by: INTERNAL MEDICINE

## 2024-04-29 PROCEDURE — 63600175 PHARM REV CODE 636 W HCPCS: Mod: HCNC,PO | Performed by: NURSE ANESTHETIST, CERTIFIED REGISTERED

## 2024-04-29 PROCEDURE — D9220A PRA ANESTHESIA: Mod: ANES,,, | Performed by: ANESTHESIOLOGY

## 2024-04-29 PROCEDURE — 88305 TISSUE EXAM BY PATHOLOGIST: CPT | Mod: 26,HCNC,, | Performed by: PATHOLOGY

## 2024-04-29 PROCEDURE — 63600175 PHARM REV CODE 636 W HCPCS: Mod: HCNC,PO | Performed by: INTERNAL MEDICINE

## 2024-04-29 PROCEDURE — 37000009 HC ANESTHESIA EA ADD 15 MINS: Mod: HCNC,PO | Performed by: INTERNAL MEDICINE

## 2024-04-29 PROCEDURE — D9220A PRA ANESTHESIA: Mod: CRNA,,, | Performed by: NURSE ANESTHETIST, CERTIFIED REGISTERED

## 2024-04-29 PROCEDURE — 27201012 HC FORCEPS, HOT/COLD, DISP: Mod: HCNC,PO | Performed by: INTERNAL MEDICINE

## 2024-04-29 PROCEDURE — 37000008 HC ANESTHESIA 1ST 15 MINUTES: Mod: HCNC,PO | Performed by: INTERNAL MEDICINE

## 2024-04-29 RX ORDER — LIDOCAINE HYDROCHLORIDE 20 MG/ML
INJECTION INTRAVENOUS
Status: DISCONTINUED | OUTPATIENT
Start: 2024-04-29 | End: 2024-04-29

## 2024-04-29 RX ORDER — PROPOFOL 10 MG/ML
VIAL (ML) INTRAVENOUS
Status: DISCONTINUED | OUTPATIENT
Start: 2024-04-29 | End: 2024-04-29

## 2024-04-29 RX ORDER — SODIUM CHLORIDE, SODIUM LACTATE, POTASSIUM CHLORIDE, CALCIUM CHLORIDE 600; 310; 30; 20 MG/100ML; MG/100ML; MG/100ML; MG/100ML
INJECTION, SOLUTION INTRAVENOUS CONTINUOUS
Status: DISCONTINUED | OUTPATIENT
Start: 2024-04-29 | End: 2024-04-29 | Stop reason: HOSPADM

## 2024-04-29 RX ORDER — SODIUM CHLORIDE 0.9 % (FLUSH) 0.9 %
10 SYRINGE (ML) INJECTION
Status: DISCONTINUED | OUTPATIENT
Start: 2024-04-29 | End: 2024-04-29 | Stop reason: HOSPADM

## 2024-04-29 RX ADMIN — LIDOCAINE HYDROCHLORIDE 50 MG: 20 INJECTION INTRAVENOUS at 10:04

## 2024-04-29 RX ADMIN — Medication 50 MG: at 10:04

## 2024-04-29 RX ADMIN — Medication 25 MG: at 10:04

## 2024-04-29 RX ADMIN — GLYCOPYRROLATE 0.2 MG: 0.2 INJECTION, SOLUTION INTRAMUSCULAR; INTRAVENOUS at 10:04

## 2024-04-29 RX ADMIN — SODIUM CHLORIDE, POTASSIUM CHLORIDE, SODIUM LACTATE AND CALCIUM CHLORIDE: 600; 310; 30; 20 INJECTION, SOLUTION INTRAVENOUS at 10:04

## 2024-04-29 NOTE — TELEPHONE ENCOUNTER
----- Message from Bossman Tomlinson sent at 4/29/2024  2:52 PM CDT -----  Regarding: refill  Type:  RX Refill Request    Who Called: pt    Refill or New Rx:refill    RX Name and Strength:omeprazole (PRILOSEC) 20 MG capsule 90 capsule 0 2/2/2024 5/2/2024   Sig - Route: Take 1 capsule (20 mg total) by mouth once daily. - Oral   Sent to pharmacy as: omeprazole (PRILOSEC) 20 MG capsule   E-Prescribing Status: Receipt confirmed by pharmacy (2/2/2024  9:16 AM CST)         How is the patient currently taking it? (ex. 1XDay):see above    Is this a 30 day or 90 day RX:see above    Preferred Pharmacy with phone number:      [facility] - [place], [place] - [address]  [address] [address]  Phone: [phone] Fax: [phone]        Local or Mail Order:Mailed    Ordering Provider:Zi Fregoso Call Back Number:[phone]      Additional Information:  Please call to discuss.

## 2024-04-29 NOTE — PROVATION PATIENT INSTRUCTIONS
Discharge Summary/Instructions after an Endoscopic Procedure  Patient Name: Ira Cooley  Patient MRN: [id number]  Patient YOB: 1975 Monday, April 29, 2024  Adam Pinon MD  Dear patient,  As a result of recent federal legislation (The Federal Cures Act), you may   receive lab or pathology results from your procedure in your [facility]   account before your physician is able to contact you. Your physician or   their representative will relay the results to you with their   recommendations at their soonest availability.  Thank you,  RESTRICTIONS:  During your procedure today, you received medications for sedation.  These   medications may affect your judgment, balance and coordination.  Therefore,   for 24 hours, you have the following restrictions:   - DO NOT drive a car, operate machinery, make legal/financial decisions,   sign important papers or drink alcohol.    ACTIVITY:  Today: no heavy lifting, straining or running due to procedural   sedation/anesthesia.  The following day: return to full activity including work.  DIET:  Eat and drink normally unless instructed otherwise.     TREATMENT FOR COMMON SIDE EFFECTS:  - Mild abdominal pain, nausea, belching, bloating or excessive gas:  rest,   eat lightly and use a heating pad.  - Sore Throat: treat with throat lozenges and/or gargle with warm salt   water.  - Because air was used during the procedure, expelling large amounts of air   from your rectum or belching is normal.  - If a bowel prep was taken, you may not have a bowel movement for 1-3 days.    This is normal.  SYMPTOMS TO WATCH FOR AND REPORT TO YOUR PHYSICIAN:  1. Abdominal pain or bloating, other than gas cramps.  2. Chest pain.  3. Back pain.  4. Signs of infection such as: chills or fever occurring within 24 hours   after the procedure.  5. Rectal bleeding, which would show as bright red, maroon, or black stools.   (A tablespoon of blood from the rectum is not serious, especially  if   hemorrhoids are present.)  6. Vomiting.  7. Weakness or dizziness.  GO DIRECTLY TO THE NEAREST EMERGENCY ROOM IF YOU HAVE ANY OF THE FOLLOWING:      Difficulty breathing              Chills and/or fever over 101 F   Persistent vomiting and/or vomiting blood   Severe abdominal pain   Severe chest pain   Black, tarry stools   Bleeding- more than one tablespoon   Any other symptom or condition that you feel may need urgent attention  Your doctor recommends these additional instructions:  If any biopsies were taken, your doctors clinic will contact you in 1 to 2   weeks with any results.  Follow an antireflux regimen.  This includes:       - Do not lie down for at least 3 to 4 hours after meals.        - Raise the head of the bed 4 to 6 inches.        - Decrease excess weight.        - Avoid citrus juices and other acidic foods, alcohol, chocolate, mints,   coffee and other caffeinated beverages, carbonated beverages, fatty and   fried foods.        - Avoid tight-fitting clothing.        - Avoid cigarettes and other tobacco products.   Continue your present medications.   Take Prilosec (omeprazole) 20 mg by mouth once a day.   Return to your GI clinic in 6-8 weeks.   Your physician has recommended a colonoscopy.  For questions, problems or results please call your physician - Adam Pinon MD at Work:  [phone].  EMERGENCY PHONE NUMBER: [phone], LAB RESULTS: [phone]  IF A COMPLICATION OR EMERGENCY SITUATION ARISES AND YOU ARE UNABLE TO REACH   YOUR PHYSICIAN - GO DIRECTLY TO THE EMERGENCY ROOM.  ___________________________________________  Nurse Signature  ___________________________________________  Patient/Designated Responsible Party Signature  Adam Pinon MD  4/29/2024 11:26:26 AM  This report has been verified and signed electronically.  Dear patient,  As a result of recent federal legislation (The Federal Cures Act), you may   receive lab or pathology results from your  procedure in your [facility]   account before your physician is able to contact you. Your physician or   their representative will relay the results to you with their   recommendations at their soonest availability.  Thank you.  PROVATION

## 2024-04-29 NOTE — BRIEF OP NOTE
Discharge Note  Short Stay      SUMMARY     Admit Date: 4/29/2024    Attending Physician: Adam Pinon Jr., MD     Discharge Physician: Adam Pinon Jr., MD    Discharge Date: 4/29/2024 11:29 AM    Final Diagnosis: Epigastric pain [R10.13]  Heartburn [R12]    Impression:            - Normal oropharynx.                          - Normal larynx.                          - Normal cricopharyngeus.                          - Normal upper third of esophagus and middle third                          of esophagus.                          - Reflux esophagitis. Biopsied.                          - Z-line regular, 34 cm from the incisors.                          - Normal cardia.                          - Normal gastric fundus and gastric body.                          - Gastritis. Biopsied.                          - Normal pylorus.                          - Normal examined duodenum.   Recommendation:        - Discharge patient to home.                          - Await pathology results.                          - Follow an antireflux regimen.                          - Exercise and weight loss.                          - Continue present medications.                          - Use Prilosec (omeprazole) 20 mg PO daily.                          - Return to GI clinic in 6-8 weeks.                          - At some point, consider to perform a colonoscopy.   Adam Pinon MD   4/29/2024       Disposition: HOME OR SELF CARE    Patient Instructions:   Current Discharge Medication List        CONTINUE these medications which have NOT CHANGED    Details   Bifidobacterium infantis (ALIGN ORAL) Take by mouth.      ERGOCALCIFEROL, VITAMIN D2, (VITAMIN D2 ORAL) Take by mouth once daily.      levothyroxine (SYNTHROID) 25 MCG tablet TAKE 1 TABLET EVERY DAY  Qty: 90 tablet, Refills: 3    Associated Diagnoses: Hypothyroidism due to acquired atrophy of thyroid      MULTIVIT WITH CALCIUM,IRON,MIN (MULTIPLE VITAMIN,  WOMENS ORAL) Take by mouth.      omeprazole (PRILOSEC) 20 MG capsule Take 1 capsule (20 mg total) by mouth once daily.  Qty: 90 capsule, Refills: 0    Associated Diagnoses: Epigastric pain; Heartburn      pravastatin (PRAVACHOL) 40 MG tablet TAKE 1 TABLET EVERY DAY  Qty: 90 tablet, Refills: 1      BIOTIN ORAL Take 1 tablet by mouth once daily.      levocetirizine (XYZAL) 5 MG tablet Take 1 tablet (5 mg total) by mouth every evening.  Qty: 30 tablet, Refills: 11    Associated Diagnoses: Sinus congestion             Discharge Procedure Orders (must include Diet, Follow-up, Activity)    Follow Up:  Follow up with PCP as per your routine.  Please follow an anti reflux diet and a high fiber diet.  Activity as tolerated.    No driving day of procedure.     no

## 2024-04-29 NOTE — ANESTHESIA PREPROCEDURE EVALUATION
04/29/2024  Ira Cooley is a 48 y.o., female.      Pre-op Assessment    I have reviewed the Patient Summary Reports.     I have reviewed the Nursing Notes. I have reviewed the NPO Status.   I have reviewed the Medications.     Review of Systems  Anesthesia Hx:  No problems with previous Anesthesia                Social:  Non-Smoker       Cardiovascular:                hyperlipidemia                             Pulmonary:  Pulmonary Normal                       Renal/:  Renal/ Normal                 Hepatic/GI:     GERD, well controlled             Neurological:  Neurology Normal          Down's Syndrome                              Endocrine:   Hypothyroidism        Obesity / BMI > 30      Physical Exam  General: Well nourished, Cooperative, Alert and Oriented    Airway:  Mallampati: II   Mouth Opening: Normal  TM Distance: Normal  Neck ROM: Normal ROM    Anesthesia Plan  Type of Anesthesia, risks & benefits discussed:    Anesthesia Type: Gen ETT, Gen Supraglottic Airway, Gen Natural Airway, MAC  Intra-op Monitoring Plan: Standard ASA Monitors  Post Op Pain Control Plan: multimodal analgesia  Induction:  IV  Airway Plan: Direct, Video and Fiberoptic, Post-Induction  Informed Consent: Informed consent signed with the Patient representative and all parties understand the risks and agree with anesthesia plan.  All questions answered.   ASA Score: 3  Anesthesia Plan Notes: H&P & Consent with Mom and Pt.    Ready For Surgery From Anesthesia Perspective.     .

## 2024-04-29 NOTE — ANESTHESIA POSTPROCEDURE EVALUATION
Anesthesia Post Evaluation    Patient: Ira Cooley    Procedure(s) Performed: Procedure(s) (LRB):  EGD (ESOPHAGOGASTRODUODENOSCOPY) (N/A)    Final Anesthesia Type: general      Patient location during evaluation: PACU  Patient participation: Yes- Able to Participate  Level of consciousness: awake and alert and oriented  Post-procedure vital signs: reviewed and stable  Pain management: adequate  Airway patency: patent    PONV status at discharge: No PONV  Anesthetic complications: no      Cardiovascular status: blood pressure returned to baseline and stable  Respiratory status: unassisted and spontaneous ventilation  Hydration status: euvolemic  Follow-up not needed.              Vitals Value Taken Time   /67 04/29/24 1126   Temp 36.4 °C (97.5 °F) 04/29/24 1101   Pulse 75 04/29/24 1126   Resp 15 04/29/24 1126   SpO2 100 % 04/29/24 1126         Event Time   Out of Recovery 11:31:16         Pain/Junior Score: Junior Score: 10 (4/29/2024 11:26 AM)

## 2024-04-29 NOTE — TRANSFER OF CARE
"Anesthesia Transfer of Care Note    Patient: Ira Cooley    Procedure(s) Performed: Procedure(s) (LRB):  EGD (ESOPHAGOGASTRODUODENOSCOPY) (N/A)    Patient location: PACU    Anesthesia Type: general    Transport from OR: Transported from OR on room air with adequate spontaneous ventilation    Post pain: adequate analgesia    Post assessment: no apparent anesthetic complications and tolerated procedure well    Post vital signs: stable    Level of consciousness: responds to stimulation    Nausea/Vomiting: no nausea/vomiting    Complications: none    Transfer of care protocol was followed      Last vitals: Visit Vitals  BP (!) 104/57   Pulse (!) 53   Temp 36.6 °C (97.8 °F)   Resp 16   Ht 4' 2" (1.27 m)   Wt 59 kg (130 lb)   LMP 12/23/2017 (Approximate)   SpO2 95%   Breastfeeding No   BMI 36.56 kg/m²     " Spoke with  regarding the patients e-message. Dr. Avila stated that the patient is to have the [place] papers as well as the medical clearance letter done by his PCP Dr. Maycol Arango. Patient was called and informed of this and is aware.

## 2024-04-29 NOTE — DISCHARGE INSTRUCTIONS
Recovery After Procedural Sedation (Adult)   You have been given medicine by vein to make you sleep during your surgery. This may have included both a pain medicine and sleeping medicine. Most of the effects have worn off. But you may still have some drowsiness for the next 6 to 8 hours.  Home care  Follow these guidelines when you get home:  For the next 8 hours, you should be watched by a responsible adult. This person should make sure your condition is not getting worse.  Don't drink any alcohol for the next 24 hours.  Don't drive, operate dangerous machinery, or make important business or personal decisions during the next 24 hours.  To prevent injury or falls, use caution when standing and walking for at least 24 hours after your procedure.  Note: Your healthcare provider may tell you not to take any medicine by mouth for pain or sleep in the next 4 hours. These medicines may react with the medicines you were given in the hospital. This could cause a much stronger response than usual.  Follow-up care  Follow up with your healthcare provider if you are not alert and back to your usual level of activity within 12 hours.  When to seek medical advice  Call your healthcare provider right away if any of these occur:  Drowsiness gets worse  Weakness or dizziness gets worse  Repeated vomiting  You can't be awakened  Fever  New rash  [employer] last reviewed this educational content on 9/1/2019  © 9034-7963 [employer]. [address] [place] [address], [place] [address]. All rights reserved. This information is not intended as a substitute for professional medical care. Always follow your healthcare professional's instructions         Tips to Control Acid Reflux    To control acid reflux, youll need to make some basic diet and lifestyle changes. The simple steps outlined below may be all youll need to ease discomfort.  Watch what you eat  Avoid fatty foods and spicy foods.  Eat fewer acidic foods, such as citrus  and tomato-based foods. These can increase symptoms.  Limit drinking alcohol, caffeine, and fizzy beverages. All increase acid reflux.  Try limiting chocolate, peppermint, and spearmint. These can worsen acid reflux in some people.  Watch when you eat  Avoid lying down for 3 hours after eating.  Do not snack before going to bed.  Raise your head  Raising your head and upper body by 4 to 6 inches helps limit reflux when youre lying down. Put blocks under the head of your bed frame to raise it.  Other changes  Lose weight, if you need to  Dont exercise near bedtime  Avoid tight-fitting clothes  Limit aspirin and ibuprofen  Stop smoking   Date Last Reviewed: 7/1/2016  © 0890-6594 [employer]. [address] [place] [place] [address], [place] [address]. All rights reserved. This information is not intended as a substitute for professional medical care. Always follow your healthcare professional's instructions.         High-Fiber Diet  Fiber is in fruits, vegetables, cereals, and grains. Fiber passes through your body undigested. A high-fiber diet helps food move through your intestinal tract. The added bulk is helpful in preventing constipation. In people with diverticulosis, fiber helps clean out the pouches along the colon wall. It also prevents new pouches from forming. A high-fiber diet reduces the risk of colon cancer. It also lowers blood cholesterol and prevents high blood sugar in people with diabetes.    The fiber-rich foods listed below should be part of your diet. If you are not used to high-fiber foods, start with 1 or 2 foods from this list. Every 3 to 4 days add a new one to your diet. Do this until you are eating 4 high-fiber foods per day. This should give you 20 to 35 grams of fiber a day. It is also important to drink a lot of water when you are on this diet. You should have 6 to 8 glasses of water a day. Water makes the fiber swell and increases the benefit.  Foods high in dietary fiber  The following  foods are high in dietary fiber:  Breads. Breads made with 100% whole-wheat flour; tej, wheat, or rye crackers; whole-grain tortillas, bran muffins.  Cereals. Whole-grain and bran cereals with bran (shredded wheat, wheat flakes, raisin bran, corn bran); oatmeal, rolled oats, granola, and brown rice.  Fruits. Fresh fruits and their edible skins (pears, prunes, raisins, berries, apples, and apricots); bananas, citrus fruit, mangoes, pineapple; and prune juice.  Nuts. Any nuts and seeds.  Vegetables. Best served raw or lightly cooked. All types, especially: green peas, celery, eggplant, potatoes, spinach, broccoli, [place] sprouts, winter squash, carrots, cauliflower, soybeans, lentils, and fresh and dried beans of all kinds.  Other. Popcorn, any spices.  Date Last Reviewed: 8/1/2016  © 3245-1561 [employer]. [address] [place] [address]. All rights reserved. This information is not intended as a substitute for professional medical care. Always follow your healthcare professional's instructions.

## 2024-04-30 RX ORDER — OMEPRAZOLE 20 MG/1
20 CAPSULE, DELAYED RELEASE ORAL DAILY
Qty: 90 CAPSULE | Refills: 0 | Status: SHIPPED | OUTPATIENT
Start: 2024-04-30 | End: 2024-07-29

## 2024-05-01 LAB
FINAL PATHOLOGIC DIAGNOSIS: NORMAL
GROSS: NORMAL
Lab: NORMAL

## 2024-06-10 ENCOUNTER — TELEPHONE (OUTPATIENT)
Dept: ADMINISTRATIVE | Facility: CLINIC | Age: 49
End: 2024-06-10
Payer: MEDICARE

## 2024-06-11 ENCOUNTER — OFFICE VISIT (OUTPATIENT)
Dept: FAMILY MEDICINE | Facility: CLINIC | Age: 49
End: 2024-06-11
Payer: MEDICARE

## 2024-06-11 VITALS
WEIGHT: 131.81 LBS | SYSTOLIC BLOOD PRESSURE: 120 MMHG | BODY MASS INDEX: 30.51 KG/M2 | HEIGHT: 55 IN | OXYGEN SATURATION: 94 % | DIASTOLIC BLOOD PRESSURE: 64 MMHG | HEART RATE: 80 BPM

## 2024-06-11 DIAGNOSIS — E78.00 HYPERCHOLESTEREMIA: Chronic | ICD-10-CM

## 2024-06-11 DIAGNOSIS — Z00.00 ENCOUNTER FOR PREVENTIVE HEALTH EXAMINATION: Primary | ICD-10-CM

## 2024-06-11 DIAGNOSIS — E66.01 SEVERE OBESITY (BMI 35.0-39.9) WITH COMORBIDITY: ICD-10-CM

## 2024-06-11 PROCEDURE — G9920 SCRNING PERF AND NEGATIVE: HCPCS | Mod: HCNC,CPTII,S$GLB, | Performed by: NURSE PRACTITIONER

## 2024-06-11 PROCEDURE — 3044F HG A1C LEVEL LT 7.0%: CPT | Mod: HCNC,CPTII,S$GLB, | Performed by: NURSE PRACTITIONER

## 2024-06-11 PROCEDURE — 99999 PR PBB SHADOW E&M-EST. PATIENT-LVL III: CPT | Mod: PBBFAC,HCNC,, | Performed by: NURSE PRACTITIONER

## 2024-06-11 PROCEDURE — G0439 PPPS, SUBSEQ VISIT: HCPCS | Mod: HCNC,S$GLB,, | Performed by: NURSE PRACTITIONER

## 2024-06-11 PROCEDURE — 1159F MED LIST DOCD IN RCRD: CPT | Mod: HCNC,CPTII,S$GLB, | Performed by: NURSE PRACTITIONER

## 2024-06-11 PROCEDURE — 3078F DIAST BP <80 MM HG: CPT | Mod: HCNC,CPTII,S$GLB, | Performed by: NURSE PRACTITIONER

## 2024-06-11 PROCEDURE — 3074F SYST BP LT 130 MM HG: CPT | Mod: HCNC,CPTII,S$GLB, | Performed by: NURSE PRACTITIONER

## 2024-06-11 PROCEDURE — 1160F RVW MEDS BY RX/DR IN RCRD: CPT | Mod: HCNC,CPTII,S$GLB, | Performed by: NURSE PRACTITIONER

## 2024-06-11 NOTE — PATIENT INSTRUCTIONS
Counseling and Referral of Other Preventative  (Italic type indicates deductible and co-insurance are waived)    Patient Name: Ira Cooley  Today's Date: 6/11/2024    Health Maintenance       Date Due Completion Date    Colorectal Cancer Screening Never done ---    COVID-19 Vaccine (3 - 2023-24 season) 09/01/2023 8/27/2021    Shingles Vaccine (1 of 2) Never done ---    Mammogram 08/26/2024 8/26/2023    HIV Screening 06/24/2026 (Originally 11/19/1990) ---    Cervical Cancer Screening 07/20/2028 (Originally 8/13/2018) 8/13/2015 (Declined)    Override on 8/13/2015: Declined    TETANUS VACCINE 07/11/2024 7/11/2014    Influenza Vaccine (Season Ended) 09/01/2024 1/24/2023    Hemoglobin A1c (Diabetic Prevention Screening) 01/27/2027 1/27/2024    Lipid Panel 01/27/2029 1/27/2024        No orders of the defined types were placed in this encounter.    The following information is provided to all patients.  This information is to help you find resources for any of the problems found today that may be affecting your health:                  Living healthy guide: www.[facility].[place].gov      Understanding Diabetes: www.diabetes.org      Eating healthy: www.cdc.gov/healthyweight      CDC home safety checklist: www.cdc.gov/steadi/patient.html      Agency on Aging: www.goea.[place].[facility]      Alcoholics anonymous (AA): www.aa.org      Physical Activity: www.justin.nih.gov/xg3ljgu      Tobacco use: www.quitwithusla.org

## 2024-06-11 NOTE — PROGRESS NOTES
"  Ira Cooley presented for a follow-up Medicare AWV today. The following components were reviewed and updated:    Medical history  Family History  Social history  Allergies and Current Medications  Health Risk Assessment  Health Maintenance  Care Team    **See Completed Assessments for Annual Wellness visit with in the encounter summary    The following assessments were completed:  Depression Screening  Cognitive function Screening  Unable to draw clock  Timed Get Up Test  Whisper Test      Opioid documentation:      Patient does not have a current opioid prescription.          Vitals:    06/11/24 1231   BP: 120/64   BP Location: Left arm   Patient Position: Sitting   BP Method: Medium (Manual)   Pulse: 80   SpO2: (!) 94%   Weight: 59.8 kg (131 lb 13.4 oz)   Height: 4' 2" (1.27 m)     Body mass index is 37.08 kg/m².       Physical Exam  Vitals reviewed.   Constitutional:       General: She is not in acute distress.  HENT:      Head: Normocephalic.   Cardiovascular:      Rate and Rhythm: Normal rate.   Pulmonary:      Effort: Pulmonary effort is normal. No respiratory distress.   Skin:     General: Skin is warm.   Neurological:      General: No focal deficit present.   Psychiatric:         Mood and Affect: Mood normal.           Diagnoses and health risks identified today and associated recommendations/orders:  1. Encounter for preventive health examination  Reviewed health maintenance and provided recommendations    Recommend tdap vaccine after 7/11/24  Recommend shingrix vaccine    2. Severe obesity (BMI 35.0-39.9) with comorbidity  Continue to monitor  Followed by Sebastien Pretty MD   Encourage ehalthy food hcocies  Encourage to continue exercise routine.      3. Hypercholesteremia  Continue to monitor  Followed by Sebastien Pretty MD   Encourage healthy food choices.        Provided Ira with a 5-10 year written screening schedule and personal prevention plan. Recommendations were developed using the USPSTF age " appropriate recommendations. Education, counseling, and referrals were provided as needed.  After Visit Summary printed and given to patient which includes a list of additional screenings\tests needed.    No follow-ups on file.      Matilde Allan NP

## 2024-07-25 ENCOUNTER — OFFICE VISIT (OUTPATIENT)
Dept: GASTROENTEROLOGY | Facility: CLINIC | Age: 49
End: 2024-07-25
Payer: MEDICARE

## 2024-07-25 VITALS
HEIGHT: 55 IN | DIASTOLIC BLOOD PRESSURE: 2 MMHG | SYSTOLIC BLOOD PRESSURE: 4 MMHG | BODY MASS INDEX: 30.56 KG/M2 | WEIGHT: 132.06 LBS

## 2024-07-25 DIAGNOSIS — Q90.9 DOWN SYNDROME: ICD-10-CM

## 2024-07-25 DIAGNOSIS — G89.29 CHRONIC RUQ PAIN: ICD-10-CM

## 2024-07-25 DIAGNOSIS — E66.09 CLASS 2 OBESITY DUE TO EXCESS CALORIES WITH BODY MASS INDEX (BMI) OF 37.0 TO 37.9 IN ADULT, UNSPECIFIED WHETHER SERIOUS COMORBIDITY PRESENT: ICD-10-CM

## 2024-07-25 DIAGNOSIS — R10.11 CHRONIC RUQ PAIN: ICD-10-CM

## 2024-07-25 DIAGNOSIS — K21.00 GASTROESOPHAGEAL REFLUX DISEASE WITH ESOPHAGITIS WITHOUT HEMORRHAGE: Primary | ICD-10-CM

## 2024-07-25 PROCEDURE — 3078F DIAST BP <80 MM HG: CPT | Mod: HCNC,CPTII,S$GLB, | Performed by: INTERNAL MEDICINE

## 2024-07-25 PROCEDURE — 3008F BODY MASS INDEX DOCD: CPT | Mod: HCNC,CPTII,S$GLB, | Performed by: INTERNAL MEDICINE

## 2024-07-25 PROCEDURE — 99213 OFFICE O/P EST LOW 20 MIN: CPT | Mod: HCNC,S$GLB,, | Performed by: INTERNAL MEDICINE

## 2024-07-25 PROCEDURE — 3044F HG A1C LEVEL LT 7.0%: CPT | Mod: HCNC,CPTII,S$GLB, | Performed by: INTERNAL MEDICINE

## 2024-07-25 PROCEDURE — 1159F MED LIST DOCD IN RCRD: CPT | Mod: HCNC,CPTII,S$GLB, | Performed by: INTERNAL MEDICINE

## 2024-07-25 PROCEDURE — 3074F SYST BP LT 130 MM HG: CPT | Mod: HCNC,CPTII,S$GLB, | Performed by: INTERNAL MEDICINE

## 2024-07-25 PROCEDURE — 1160F RVW MEDS BY RX/DR IN RCRD: CPT | Mod: HCNC,CPTII,S$GLB, | Performed by: INTERNAL MEDICINE

## 2024-07-25 PROCEDURE — 99999 PR PBB SHADOW E&M-EST. PATIENT-LVL III: CPT | Mod: PBBFAC,HCNC,, | Performed by: INTERNAL MEDICINE

## 2024-07-25 NOTE — PROGRESS NOTES
Subjective     Patient ID: Ira Cooley is a 48 y.o. female.    Chief Complaint: No chief complaint on file.    Ira, Down's patient, is here with her mother today, for follow-up after recent EGD, see below.  She still complain from time to time that her stomach hurts, mainly right upper quadrant area.  She is still taking the omeprazole 20 mg) though she is waiting on [employer] to send her her prescription.  They bought some OTC product.  She is also taking Maalox.        See recent Upper GI endoscopy 4/29/2024   Indications:           Epigastric abdominal pain, Heartburn   Comorbidities  -    Down's Syndrome   Providers:             Adam Pinon MD   Impression:    - Normal oropharynx.                          - Normal larynx.                          - Normal cricopharyngeus.                          - Normal upper third of esophagus and middle third of esophagus.                          - Reflux esophagitis. Biopsied.                          - Z-line regular, 34 cm from the incisors.                          - Normal cardia.                          - Normal gastric fundus and gastric body.                          - Gastritis. Biopsied.                          - Normal pylorus.                          - Normal examined duodenum.   Recommendation:        - Discharge patient to home.                          - Await pathology results.                          - Follow an antireflux regimen.                          - Exercise and weight loss.                          - Continue present medications.                          - Use Prilosec (omeprazole) 20 mg PO daily.                          - Return to GI clinic in 6-8 weeks.                          - At some point, consider to perform a colonoscopy.   Adam Pinon MD   4/29/2024     1. DISTAL ESOPHAGUS, BIOPSY:   Esophageal squamous mucosa without significant pathologic changes.    Negative for eosinophils, intestinal metaplasia,  dysplasia or malignancy.     2. PRE-PYLORIC GASTRITIS, BIOPSY:   Gastric antral mucosa with mild chronic inflammation and reactive gastropathy.   No features of H pylori are identified on routine staining.    Negative for intestinal metaplasia, dysplasia or malignancy.         See last GI OV note, 2/2/2024:  Abdominal Pain  This is a chronic problem. The current episode started more than 1 month ago. The onset quality is sudden. The problem occurs intermittently. The problem has been unchanged. The pain is located in the epigastric region. The quality of the pain is aching. The abdominal pain does not radiate. Pertinent negatives include no anorexia, belching, constipation, diarrhea, dysuria, fever, flatus, frequency, hematochezia, melena, nausea, vomiting or weight loss. Associated symptoms comments: Associated symptoms include heartburn. Nothing aggravates the pain. The pain is relieved by Nothing. She has tried nothing for the symptoms. Prior diagnostic workup includes GI consult and ultrasound. Her past medical history is significant for GERD. There is no history of abdominal surgery, colon cancer, Crohn's disease, gallstones, irritable bowel syndrome, pancreatitis, PUD or ulcerative colitis.            Review of Systems   Constitutional:  Negative for appetite change, fever and unexpected weight change.   HENT: Negative.  Negative for mouth sores, sore throat and trouble swallowing.    Eyes: Negative.    Respiratory: Negative.  Negative for cough and choking.    Cardiovascular: Negative.  Negative for chest pain and leg swelling.   Gastrointestinal:  Positive for abdominal pain. Negative for abdominal distention, anal bleeding, blood in stool, constipation, diarrhea, nausea and vomiting.   Genitourinary: Negative.    Musculoskeletal: Negative.    Integumentary:  Negative for color change and rash. Negative.   Neurological: Negative.    Hematological:  Negative for adenopathy.   Psychiatric/Behavioral: Negative.             Objective     Physical Exam  Vitals and nursing note reviewed.   Constitutional:       General: She is not in acute distress.     Appearance: She is well-developed. She is obese.   HENT:      Mouth/Throat:      Mouth: Mucous membranes are moist.      Pharynx: No oropharyngeal exudate.   Eyes:      General: No scleral icterus.  Neck:      Thyroid: No thyromegaly.      Trachea: No tracheal deviation.   Cardiovascular:      Rate and Rhythm: Normal rate and regular rhythm.      Heart sounds: Normal heart sounds.   Pulmonary:      Effort: Pulmonary effort is normal.      Breath sounds: Normal breath sounds.   Abdominal:      General: Bowel sounds are normal. There is no distension.      Palpations: Abdomen is soft. There is no mass.      Tenderness: There is abdominal tenderness (Flinches when palpating the abdomen, diffusely.  No rebound tenderness.). There is no guarding.   Lymphadenopathy:      Cervical: No cervical adenopathy.   Skin:     General: Skin is warm and dry.      Findings: No rash.   Neurological:      General: No focal deficit present.      Mental Status: She is alert and oriented to person, place, and time.   Psychiatric:         Mood and Affect: Mood normal.         Behavior: Behavior normal.            Assessment and Plan     Gastroesophageal reflux disease with esophagitis without hemorrhage    Chronic RUQ pain    Class 2 obesity due to excess calories with body mass index (BMI) of 37.0 to 37.9 in adult, unspecified whether serious comorbidity present    Down syndrome      Encouraged mother to prepare smoothies, to see if we can improve her nutritional intake.  Continue meds the same.  RTC 6 months, sooner p.r.n..         No follow-ups on file.

## 2024-07-30 ENCOUNTER — LAB VISIT (OUTPATIENT)
Dept: LAB | Facility: HOSPITAL | Age: 49
End: 2024-07-30
Attending: NURSE PRACTITIONER
Payer: MEDICARE

## 2024-07-30 DIAGNOSIS — E78.2 MIXED HYPERLIPIDEMIA: ICD-10-CM

## 2024-07-30 DIAGNOSIS — E03.4 HYPOTHYROIDISM DUE TO ACQUIRED ATROPHY OF THYROID: ICD-10-CM

## 2024-07-30 LAB
ALBUMIN SERPL BCP-MCNC: 3.7 G/DL (ref 3.5–5.2)
ALP SERPL-CCNC: 85 U/L (ref 55–135)
ALT SERPL W/O P-5'-P-CCNC: 43 U/L (ref 10–44)
ANION GAP SERPL CALC-SCNC: 9 MMOL/L (ref 8–16)
AST SERPL-CCNC: 33 U/L (ref 10–40)
BASOPHILS # BLD AUTO: 0.1 K/UL (ref 0–0.2)
BASOPHILS NFR BLD: 2.1 % (ref 0–1.9)
BILIRUB SERPL-MCNC: 0.5 MG/DL (ref 0.1–1)
BUN SERPL-MCNC: 10 MG/DL (ref 6–20)
CALCIUM SERPL-MCNC: 9.6 MG/DL (ref 8.7–10.5)
CHLORIDE SERPL-SCNC: 104 MMOL/L (ref 95–110)
CHOLEST SERPL-MCNC: 197 MG/DL (ref 120–199)
CHOLEST/HDLC SERPL: 3.7 {RATIO} (ref 2–5)
CO2 SERPL-SCNC: 26 MMOL/L (ref 23–29)
CREAT SERPL-MCNC: 0.8 MG/DL (ref 0.5–1.4)
DIFFERENTIAL METHOD BLD: ABNORMAL
EOSINOPHIL # BLD AUTO: 0.1 K/UL (ref 0–0.5)
EOSINOPHIL NFR BLD: 1.9 % (ref 0–8)
ERYTHROCYTE [DISTWIDTH] IN BLOOD BY AUTOMATED COUNT: 14.2 % (ref 11.5–14.5)
EST. GFR  (NO RACE VARIABLE): >60 ML/MIN/1.73 M^2
GLUCOSE SERPL-MCNC: 90 MG/DL (ref 70–110)
HCT VFR BLD AUTO: 45 % (ref 37–48.5)
HDLC SERPL-MCNC: 53 MG/DL (ref 40–75)
HDLC SERPL: 26.9 % (ref 20–50)
HGB BLD-MCNC: 14.9 G/DL (ref 12–16)
IMM GRANULOCYTES # BLD AUTO: 0.03 K/UL (ref 0–0.04)
IMM GRANULOCYTES NFR BLD AUTO: 0.6 % (ref 0–0.5)
LDLC SERPL CALC-MCNC: 96.2 MG/DL (ref 63–159)
LYMPHOCYTES # BLD AUTO: 1.6 K/UL (ref 1–4.8)
LYMPHOCYTES NFR BLD: 33.1 % (ref 18–48)
MCH RBC QN AUTO: 33.3 PG (ref 27–31)
MCHC RBC AUTO-ENTMCNC: 33.1 G/DL (ref 32–36)
MCV RBC AUTO: 101 FL (ref 82–98)
MONOCYTES # BLD AUTO: 0.4 K/UL (ref 0.3–1)
MONOCYTES NFR BLD: 8.8 % (ref 4–15)
NEUTROPHILS # BLD AUTO: 2.6 K/UL (ref 1.8–7.7)
NEUTROPHILS NFR BLD: 53.5 % (ref 38–73)
NONHDLC SERPL-MCNC: 144 MG/DL
NRBC BLD-RTO: 0 /100 WBC
PLATELET # BLD AUTO: 266 K/UL (ref 150–450)
PMV BLD AUTO: 10.2 FL (ref 9.2–12.9)
POTASSIUM SERPL-SCNC: 4.1 MMOL/L (ref 3.5–5.1)
PROT SERPL-MCNC: 7.2 G/DL (ref 6–8.4)
RBC # BLD AUTO: 4.47 M/UL (ref 4–5.4)
SODIUM SERPL-SCNC: 139 MMOL/L (ref 136–145)
TRIGL SERPL-MCNC: 239 MG/DL (ref 30–150)
TSH SERPL DL<=0.005 MIU/L-ACNC: 3.4 UIU/ML (ref 0.4–4)
WBC # BLD AUTO: 4.77 K/UL (ref 3.9–12.7)

## 2024-07-30 PROCEDURE — 36415 COLL VENOUS BLD VENIPUNCTURE: CPT | Mod: HCNC,PO | Performed by: NURSE PRACTITIONER

## 2024-07-30 PROCEDURE — 80061 LIPID PANEL: CPT | Mod: HCNC | Performed by: NURSE PRACTITIONER

## 2024-07-30 PROCEDURE — 84443 ASSAY THYROID STIM HORMONE: CPT | Mod: HCNC | Performed by: NURSE PRACTITIONER

## 2024-07-30 PROCEDURE — 80053 COMPREHEN METABOLIC PANEL: CPT | Mod: HCNC | Performed by: NURSE PRACTITIONER

## 2024-07-30 PROCEDURE — 85025 COMPLETE CBC W/AUTO DIFF WBC: CPT | Mod: HCNC | Performed by: NURSE PRACTITIONER

## 2024-08-08 ENCOUNTER — TELEPHONE (OUTPATIENT)
Dept: GASTROENTEROLOGY | Facility: CLINIC | Age: 49
End: 2024-08-08
Payer: MEDICARE

## 2024-08-15 ENCOUNTER — OFFICE VISIT (OUTPATIENT)
Dept: OTOLARYNGOLOGY | Facility: CLINIC | Age: 49
End: 2024-08-15
Payer: MEDICARE

## 2024-08-15 ENCOUNTER — OFFICE VISIT (OUTPATIENT)
Dept: FAMILY MEDICINE | Facility: CLINIC | Age: 49
End: 2024-08-15
Payer: MEDICARE

## 2024-08-15 VITALS
WEIGHT: 131.38 LBS | OXYGEN SATURATION: 97 % | BODY MASS INDEX: 30.4 KG/M2 | HEART RATE: 102 BPM | DIASTOLIC BLOOD PRESSURE: 70 MMHG | HEIGHT: 55 IN | SYSTOLIC BLOOD PRESSURE: 120 MMHG

## 2024-08-15 DIAGNOSIS — Z00.00 PREVENTATIVE HEALTH CARE: Primary | ICD-10-CM

## 2024-08-15 DIAGNOSIS — K76.0 HEPATIC STEATOSIS: ICD-10-CM

## 2024-08-15 DIAGNOSIS — H61.23 BILATERAL IMPACTED CERUMEN: ICD-10-CM

## 2024-08-15 DIAGNOSIS — E03.4 HYPOTHYROIDISM DUE TO ACQUIRED ATROPHY OF THYROID: ICD-10-CM

## 2024-08-15 DIAGNOSIS — K21.9 GERD WITHOUT ESOPHAGITIS: Chronic | ICD-10-CM

## 2024-08-15 DIAGNOSIS — Z12.31 ENCOUNTER FOR SCREENING MAMMOGRAM FOR MALIGNANT NEOPLASM OF BREAST: ICD-10-CM

## 2024-08-15 DIAGNOSIS — Z12.11 COLON CANCER SCREENING: ICD-10-CM

## 2024-08-15 DIAGNOSIS — Z71.85 VACCINE COUNSELING: ICD-10-CM

## 2024-08-15 DIAGNOSIS — E78.2 MIXED HYPERLIPIDEMIA: ICD-10-CM

## 2024-08-15 DIAGNOSIS — E66.01 SEVERE OBESITY (BMI 35.0-39.9) WITH COMORBIDITY: ICD-10-CM

## 2024-08-15 DIAGNOSIS — Q90.9 DOWN SYNDROME: ICD-10-CM

## 2024-08-15 PROBLEM — R12 HEARTBURN: Status: RESOLVED | Noted: 2024-04-29 | Resolved: 2024-08-15

## 2024-08-15 PROCEDURE — 99999 PR PBB SHADOW E&M-EST. PATIENT-LVL III: CPT | Mod: PBBFAC,HCNC,, | Performed by: PHYSICIAN ASSISTANT

## 2024-08-15 PROCEDURE — 99999 PR PBB SHADOW E&M-EST. PATIENT-LVL II: CPT | Mod: PBBFAC,HCNC,, | Performed by: NURSE PRACTITIONER

## 2024-08-15 NOTE — PROGRESS NOTES
Subjective:       Patient ID: Ira Cooley is a 48 y.o. female.    Chief Complaint: Cerumen Impaction      HPI   Patient last seen 1.5 years ago for hearing loss. Patient having trouble hearing and recently informed of cerumen impactions in both ears. No c/o otalgia. No otorrhea.     Review of Systems   Constitutional: Negative.    HENT: Negative.     Eyes: Negative.    Respiratory: Negative.     Cardiovascular: Negative.    Gastrointestinal: Negative.    Musculoskeletal: Negative.    Integumentary:  Negative.   Neurological: Negative.    Hematological: Negative.    Psychiatric/Behavioral: Negative.           Objective:      Physical Exam  Vitals and nursing note reviewed.   Constitutional:       General: She is not in acute distress.     Appearance: She is obese. She is not ill-appearing or diaphoretic.   HENT:      Head: Normocephalic and atraumatic.      Right Ear: Hearing, tympanic membrane, ear canal and external ear normal. No middle ear effusion. Tympanic membrane is not erythematous.      Left Ear: Hearing, tympanic membrane, ear canal and external ear normal.  No middle ear effusion. Tympanic membrane is not erythematous.      Nose: Nose normal.   Eyes:      General: Lids are normal. No scleral icterus.        Right eye: No discharge.         Left eye: No discharge.   Pulmonary:      Effort: Pulmonary effort is normal. No respiratory distress.   Musculoskeletal:         General: Normal range of motion.   Skin:     General: Skin is warm and dry.   Neurological:      Mental Status: She is alert.      Gait: Gait normal.   Psychiatric:         Behavior: Behavior is cooperative.      SEPARATE PROCEDURE IN OFFICE:   Procedure: Removal of impacted cerumen, bilateral   Pre Procedure Diagnosis: Cerumen Impaction   Post Procedure Diagnosis: Cerumen Impaction   Verbal informed consent in regards to risk of trauma to ear canal, ear drum or hearing, discomfort during procedure and/or inability to remove cerumen  impaction in one session or unforeseen events or complications.   No anesthesia.     Procedure in detail:   Ear canal visualized bilateral with appropriate size ear speculum utilizing Operating Head Binocular Otomicroscope   Utilizing the following:  Ring curet, delicate alligator forceps, and suction cannula were used. The impacted cerumen of the ear canals was removed atraumatically. The TM and EAC were then inspected and found to be clear of wax. See description of TMs/EACs in PE above.   Complications: No   Condition: Improved/Good     Assessment:       Problem List Items Addressed This Visit    None  Visit Diagnoses       Bilateral impacted cerumen                Plan:     Debrox prn    Patient encouraged to return to clinic if symptoms worsen/persist and as needed for further ENT symptoms or concerns.

## 2024-08-15 NOTE — PATIENT INSTRUCTIONS
A few reminders from today:    Try to cut down on fried chicken  Try to cut down on high trans fat and saturated fat food  Try robert sugar free sweet tea  Complete home stool kit  Schedule mammogram  Think about getting your shingles shot  Schedule with ENT  F/u with PCP in 6 months    Follow up with me if needed.   Please go to ER/urgent care if after hours or symptoms persist/worsen.     Do not hesitate to get in touch with me should you have any further questions.     Thank you for trusting me with your care!  I wish you health and happiness.    -Mallory Chairez PA-C

## 2024-08-15 NOTE — PROGRESS NOTES
Subjective     Patient ID: Ira Cooley is a 48 y.o. female.    Chief Complaint: Follow-up (6 month f/u)      HPI      Pt is new to me, PCP Dr. Pretty.     Pt is a 48 year old female with HLD, obesity, hypothyroidism, GERD, down syndrome. She presents today for a 6 month checkup.  She is feeling well today and has no complaints. She is being followed by GI for intermittent RUQ pain. Pt admits to poor diet. Eats fried chicken daily. Loves sweet tea. We reviewed her labs. Mild macrocytosis likely due to hypothyroidism/fatty liver. TSH at goal. TGs are elevated.     Review of Systems   Constitutional:  Negative for activity change, appetite change, chills, fatigue, fever and unexpected weight change.   HENT:  Negative for nasal congestion, ear pain, hearing loss, rhinorrhea and sore throat.    Eyes:  Negative for visual disturbance.   Respiratory:  Negative for cough, chest tightness, shortness of breath and wheezing.    Cardiovascular:  Negative for chest pain and palpitations.   Gastrointestinal:  Positive for abdominal pain. Negative for abdominal distention, change in bowel habit, constipation, diarrhea, nausea, vomiting and reflux.   Genitourinary:  Negative for difficulty urinating, dysuria, frequency, hematuria, menstrual irregularity, menstrual problem and urgency.   Musculoskeletal:  Negative for arthralgias and myalgias.   Integumentary:  Negative for rash.   Neurological:  Negative for dizziness, vertigo, seizures, syncope, weakness, light-headedness and headaches.   Psychiatric/Behavioral:  Negative for dysphoric mood and sleep disturbance. The patient is not nervous/anxious.           Objective     Physical Exam  Vitals and nursing note reviewed.   Constitutional:       General: She is not in acute distress.     Appearance: Normal appearance. She is not ill-appearing, toxic-appearing or diaphoretic.   HENT:      Head: Atraumatic.      Right Ear: Tympanic membrane, ear canal and external ear normal. There  "is impacted cerumen.      Left Ear: Tympanic membrane, ear canal and external ear normal. There is impacted cerumen.      Nose: No congestion or rhinorrhea.      Mouth/Throat:      Pharynx: No oropharyngeal exudate or posterior oropharyngeal erythema.   Eyes:      General: No scleral icterus.        Right eye: No discharge.         Left eye: No discharge.      Conjunctiva/sclera: Conjunctivae normal.      Pupils: Pupils are equal, round, and reactive to light.   Neck:      Thyroid: No thyroid mass, thyromegaly or thyroid tenderness.   Cardiovascular:      Rate and Rhythm: Normal rate and regular rhythm.      Pulses: Normal pulses.      Heart sounds: Normal heart sounds. No murmur heard.     No friction rub. No gallop.   Pulmonary:      Effort: Pulmonary effort is normal. No respiratory distress.      Breath sounds: Normal breath sounds. No stridor. No wheezing, rhonchi or rales.   Abdominal:      General: Abdomen is flat. Bowel sounds are normal.      Palpations: Abdomen is soft.   Musculoskeletal:         General: No deformity or signs of injury.      Cervical back: Normal range of motion and neck supple.      Right lower leg: No edema.      Left lower leg: No edema.   Lymphadenopathy:      Cervical: No cervical adenopathy.   Skin:     General: Skin is warm.      Coloration: Skin is not jaundiced or pale.      Findings: No lesion or rash.   Neurological:      General: No focal deficit present.      Mental Status: She is alert and oriented to person, place, and time. Mental status is at baseline.   Psychiatric:         Mood and Affect: Mood normal.         Behavior: Behavior normal.         Thought Content: Thought content normal.         Judgment: Judgment normal.       Ear Cerumen Removal    Date/Time: 8/15/2024 1:15 PM    Performed by: Mallory Chairez PA-C  Authorized by: Mallory Chairez PA-C    Time out: Immediately prior to procedure a "time out" was called to verify the correct patient, procedure, equipment, " support staff and site/side marked as required.    Consent Done?:  Yes (Verbal)  Medication Used:  Other  Location details:  Both ears  Procedure type: irrigation    Cerumen  Removal Results:  Cerumen partially removed  Patient tolerance:  Patient tolerated the procedure well with no immediate complications    1. Preventative health care  -reviewed labs    2. Colon cancer screening  - Fecal Immunochemical Test (iFOBT); Future    3. Encounter for screening mammogram for malignant neoplasm of breast  - Mammo Digital Screening Bilat w/ Augustus; Future    4. Mixed hyperlipidemia  -continue pravastatin    5. Severe obesity (BMI 35.0-39.9) with comorbidity  -discussed lifestyle change/diet recommendations    6. Hypothyroidism due to acquired atrophy of thyroid  -continue synthroid, tsh in range    7. GERD without esophagitis  -followed by Dr. Pinon, continue PPI    8. Down syndrome    9. Bilateral impacted cerumen  -irrigated in clinic today, unable to fully remove. Cannot tolerate. Recommend debrox drops once nightly for the next week, followed by f/u with ent    10. Vaccine counseling  -discussed shingles vaccine, pt will consider. Has allergy marked for tetanus vaccine.     RTC/ER precautions given. F/U 6 months with pcp    Mallory Chairez PA-C

## 2024-08-27 ENCOUNTER — HOSPITAL ENCOUNTER (OUTPATIENT)
Dept: RADIOLOGY | Facility: HOSPITAL | Age: 49
Discharge: HOME | End: 2024-08-27
Attending: PHYSICIAN ASSISTANT
Payer: MEDICARE

## 2024-08-27 DIAGNOSIS — Z12.31 ENCOUNTER FOR SCREENING MAMMOGRAM FOR MALIGNANT NEOPLASM OF BREAST: ICD-10-CM

## 2024-08-27 PROCEDURE — 77067 SCR MAMMO BI INCL CAD: CPT | Mod: 26,HCNC,, | Performed by: RADIOLOGY

## 2024-08-27 PROCEDURE — 77067 SCR MAMMO BI INCL CAD: CPT | Mod: TC,HCNC,PO

## 2024-08-27 PROCEDURE — 77063 BREAST TOMOSYNTHESIS BI: CPT | Mod: 26,HCNC,, | Performed by: RADIOLOGY

## 2024-11-01 DIAGNOSIS — E03.4 HYPOTHYROIDISM DUE TO ACQUIRED ATROPHY OF THYROID: ICD-10-CM

## 2024-11-01 NOTE — TELEPHONE ENCOUNTER
----- Message from Neela sent at 11/1/2024 10:24 AM CDT -----  Type:  RX Refill Request    Who Called: Iwonarid - mom    Refill or New Rx:refill    RX Name and Strength:levothyroxine (SYNTHROID) 25 MCG tablet    How is the patient currently taking it? (ex. 1XDay):as directed    Is this a 30 day or 90 day RX:#90    Preferred Pharmacy with phone number:  [facility] - [facility] [address]  [address] [facility] [address] [address]  Phone: [phone] Fax: [phone]        Local or Mail Order:mail order    Ordering Provider:Sukhwinder    Would the patient rather a call back or a response via [facility]? Call back    Best Call Back Number:[phone]      Additional Information:       Please call Back to advise. Thanks!

## 2024-11-01 NOTE — TELEPHONE ENCOUNTER
No care due was identified.  [facility] Embedded Care Due Messages. Reference number: [id number].   11/01/2024 11:50:55 AM CDT

## 2024-11-01 NOTE — TELEPHONE ENCOUNTER
Refill Routing Note   Medication(s) are not appropriate for processing by [facility] for the following reason(s):        Patient not seen by provider within 15 months    [facility] action(s):  Defer               Appointments  past 12m or future 3m with PCP    Date Provider   Last Visit   8/1/2023 Sebastien Pretty MD   Next Visit   2/18/2025 Sebastien Pretty MD   ED visits in past 90 days: 0        Note composed:3:49 PM 11/01/2024

## 2024-11-04 RX ORDER — LEVOTHYROXINE SODIUM 25 UG/1
25 TABLET ORAL DAILY
Qty: 90 TABLET | Refills: 1 | Status: SHIPPED | OUTPATIENT
Start: 2024-11-04

## 2024-11-12 ENCOUNTER — OFFICE VISIT (OUTPATIENT)
Dept: OPTOMETRY | Facility: CLINIC | Age: 49
End: 2024-11-12
Payer: MEDICARE

## 2024-11-12 DIAGNOSIS — Z13.5 GLAUCOMA SCREENING: ICD-10-CM

## 2024-11-12 DIAGNOSIS — H25.13 NUCLEAR SCLEROSIS, BILATERAL: Primary | ICD-10-CM

## 2024-11-12 DIAGNOSIS — H20.813 HETEROCHROMIA OF IRIS OF BOTH EYES: ICD-10-CM

## 2024-11-12 DIAGNOSIS — H52.13 MYOPIA WITH ASTIGMATISM AND PRESBYOPIA, BILATERAL: ICD-10-CM

## 2024-11-12 DIAGNOSIS — H02.005 ENTROPION OF BOTH LOWER EYELIDS: ICD-10-CM

## 2024-11-12 DIAGNOSIS — H52.4 MYOPIA WITH ASTIGMATISM AND PRESBYOPIA, BILATERAL: ICD-10-CM

## 2024-11-12 DIAGNOSIS — H53.003 AMBLYOPIA, BILATERAL: ICD-10-CM

## 2024-11-12 DIAGNOSIS — H02.002 ENTROPION OF BOTH LOWER EYELIDS: ICD-10-CM

## 2024-11-12 DIAGNOSIS — H52.203 MYOPIA WITH ASTIGMATISM AND PRESBYOPIA, BILATERAL: ICD-10-CM

## 2024-11-12 PROCEDURE — 1159F MED LIST DOCD IN RCRD: CPT | Mod: HCNC,CPTII,S$GLB, | Performed by: OPTOMETRIST

## 2024-11-12 PROCEDURE — 92014 COMPRE OPH EXAM EST PT 1/>: CPT | Mod: HCNC,S$GLB,, | Performed by: OPTOMETRIST

## 2024-11-12 PROCEDURE — 99999 PR PBB SHADOW E&M-EST. PATIENT-LVL II: CPT | Mod: PBBFAC,HCNC,, | Performed by: OPTOMETRIST

## 2024-11-12 PROCEDURE — 3044F HG A1C LEVEL LT 7.0%: CPT | Mod: HCNC,CPTII,S$GLB, | Performed by: OPTOMETRIST

## 2024-11-12 NOTE — PATIENT INSTRUCTIONS
"DRY EYES -- BURNING OR TEO SYMPTOMS:  Use Over The Counter artificial tears as needed for dry eye symptoms.   Some common brands include:  Systane, Optive, Refresh, and Thera-Tears.  These drops can be used as frequently as desired, but may be most helpful use during long periods of concentrated work.  For example, reading / working at the computer. Start with 3-4x per day.     Nighttime Ophthalmic gel or ointments are available: Refresh PM, Genteal, and Lacrilube.    Avoid drops that "get redness out" (Visine, Murine, Clear Eyes), as these may contain medication that could further irritate the eyes, especially with chronic use.    ALLERGY EYES -- ITCHING SYMPTOMS:  Over the counter medications include--Pataday, Zaditor, and Alaway.  Use as directed 1-2 drops daily for symptoms of itching / watering eyes.  These drops will not help for dry eye or exposure symptoms.    REDNESS RELIEF:  Lumify---is a good redness reliever that will not cause irritation if used chronically.        FLASHES / FLOATERS / POSTERIOR VITREOUS DETACHMENT    Call the clinic if you have any further changes in symptoms.  Including:  Increased numbers of floaters or flashing lights, dimness or darkness that moves through or stays constant in your vision, or any pain in the eye (s).    You may sometimes see small specks or clouds moving in your field of vision.  They are called FLOATERS.  You can often see them when looking at a plain background, like a blank wall or blue paula.  Floaters are actually tiny clumps of gel or cells inside the VITREOUS, the clear jelly-like fluid that fills the inside of your eye.    While these objects look like they are in front of your eye, they are actually floating inside.  What you see are the shadows they cast on the RETINA, the nerve layer at the back of the eye that senses light and allows you to see.      POSTERIOR VITREOUS DETACHMENT    The appearance of new floaters may be alarming.  If you suddenly " develop new floaters, you should contact your eye care professional  right away.    The retina can tear if the shrinking vitreous pulls away from the wall of the eye.  This sometimes causes a small amount of bleeding in the eye that may appear as new floaters.    A torn retina is always a serious problem, since it can lead to a retinal detachment.  You should see your eye care professional as soon as possible if:    even one new floater appears suddenly;  you see sudden flashes of light;  you notice other symptoms, like the loss of side vision, or a curtain closes down in your vision        POSTERIOR VITREOUS DETACHMENT is more common for people who:    are nearsighted;  have had cataract surgery;  have had YAG laser surgery of the eye;  have had inflammation inside the eye;  are over age 60.      While some floaters may remain visible, many of them will fade over time and become less noticeable.  Even if you've had some floaters for years, you should have your eyes checked as soon as possible if you notice new ones.    FLASHING LIGHTS    When the vitreous gel rubs or pulls on the retina, you may see what look like flashing lights or lightning streaks.  These flashes can appear off and on for several weeks or months.      Some people experience flashes of light that appear as jagged lines or heat waves in both eyes, lasting 10-20 minutes.  These flashes are caused by a spasm of blood vessels in the brain, which is called a migraine.    If a headache follows these flashes, it's called a migraine headache.  If   no headache occurs, these flashes are called Ophthalmic or Ocular Migraine.

## 2024-11-12 NOTE — PROGRESS NOTES
HPI    PT PRESENTS TODAY FOR A DFE.  PT STATES NO CHANGES IN [place] SINCE LAST VISIT.  PT WEARS PALS.  PT DOES NOT INSTILL ANY GTTS.  PTS MOTHER STATES SHE RUBS OU A LOT, C/O WATERY AND ITCHY AT HOME.      Last edited by Irina Samaniego on 11/12/2024  2:24 PM.            Assessment /Plan     For exam results, see Encounter Report.    Nuclear sclerosis, bilateral    Amblyopia, bilateral    Heterochromia of iris of both eyes    Glaucoma screening    Entropion of both lower eyelids    Myopia with astigmatism and presbyopia, bilateral         1. Early NS w/ cortical changes OD>OS, not vis sig for consult --Monitor   2. Mild-moderate OU w/ early ref deprivation---bcva ~ 20/40-  3. Longstanding / stable  4. Not suspect  5. Mild entropion nasally OU, anatomy due to down's---aysmptomatic, monitor only   6. Updated and gave copy  Ok keep old specs  No new refraction     Discussed and educated patient on current findings /plan.  RTC 1 year, prn if any changes / issues

## 2024-12-05 DIAGNOSIS — R10.13 EPIGASTRIC PAIN: ICD-10-CM

## 2024-12-05 DIAGNOSIS — R12 HEARTBURN: ICD-10-CM

## 2024-12-05 RX ORDER — OMEPRAZOLE 20 MG/1
20 CAPSULE, DELAYED RELEASE ORAL DAILY
Qty: 90 CAPSULE | Refills: 3 | Status: SHIPPED | OUTPATIENT
Start: 2024-12-05

## 2024-12-05 NOTE — TELEPHONE ENCOUNTER
----- Message from Rhiannon sent at 12/5/2024 11:01 AM CST -----  Regarding: new rx  Contact: pt's mother  Type:  RX Refill Request    Who Called: pt's mother    Refill or New Rx:refill    RX Name and Strength:omeprazole (PRILOSEC) 20 MG capsule    How is the patient currently taking it? (ex. 1XDay): 1/day    Is this a 30 day or 90 day RX:30    Preferred Pharmacy with phone number:  [facility] - [place], [place] - [address]  [address] [facility] [address]  Phone: [phone] Fax: [phone]    Local or Mail Order:local    Ordering Provider:iwona    Would the patient rather a call back or a response via [facility]? Call back    Best Call Back Number:[phone]    New rx required

## 2024-12-10 ENCOUNTER — OFFICE VISIT (OUTPATIENT)
Dept: GASTROENTEROLOGY | Facility: CLINIC | Age: 49
End: 2024-12-10
Payer: MEDICARE

## 2024-12-10 VITALS — BODY MASS INDEX: 30.97 KG/M2 | WEIGHT: 133.81 LBS | HEIGHT: 55 IN

## 2024-12-10 DIAGNOSIS — K21.9 GERD WITHOUT ESOPHAGITIS: Primary | Chronic | ICD-10-CM

## 2024-12-10 DIAGNOSIS — E66.1 CLASS 2 DRUG-INDUCED OBESITY WITH BODY MASS INDEX (BMI) OF 38.0 TO 38.9 IN ADULT, UNSPECIFIED WHETHER SERIOUS COMORBIDITY PRESENT: ICD-10-CM

## 2024-12-10 DIAGNOSIS — Q90.9 DOWN SYNDROME: ICD-10-CM

## 2024-12-10 DIAGNOSIS — E66.812 CLASS 2 DRUG-INDUCED OBESITY WITH BODY MASS INDEX (BMI) OF 38.0 TO 38.9 IN ADULT, UNSPECIFIED WHETHER SERIOUS COMORBIDITY PRESENT: ICD-10-CM

## 2024-12-10 PROCEDURE — 99213 OFFICE O/P EST LOW 20 MIN: CPT | Mod: HCNC,S$GLB,, | Performed by: INTERNAL MEDICINE

## 2024-12-10 PROCEDURE — 3008F BODY MASS INDEX DOCD: CPT | Mod: HCNC,CPTII,S$GLB, | Performed by: INTERNAL MEDICINE

## 2024-12-10 PROCEDURE — 99999 PR PBB SHADOW E&M-EST. PATIENT-LVL III: CPT | Mod: PBBFAC,HCNC,, | Performed by: INTERNAL MEDICINE

## 2024-12-27 ENCOUNTER — PATIENT MESSAGE (OUTPATIENT)
Dept: OPTOMETRY | Facility: CLINIC | Age: 49
End: 2024-12-27
Payer: MEDICARE

## 2025-02-11 RX ORDER — PRAVASTATIN SODIUM 40 MG/1
TABLET ORAL
Qty: 90 TABLET | Refills: 3 | Status: SHIPPED | OUTPATIENT
Start: 2025-02-11

## 2025-02-11 NOTE — TELEPHONE ENCOUNTER
Requested Prescriptions     Pending Prescriptions Disp Refills    pravastatin (PRAVACHOL) 40 MG tablet [Pharmacy Med Name: Pravastatin Sodium Oral Tablet 40 MG] 90 tablet 3     Sig: TAKE 1 TABLET EVERY DAY    LOV:8/1/2023  NOV:2/18/2025

## 2025-02-11 NOTE — TELEPHONE ENCOUNTER
No care due was identified.  [facility] Embedded Care Due Messages. Reference number: [id number].   2/11/2025 11:09:37 AM CST

## 2025-02-17 ENCOUNTER — PATIENT OUTREACH (OUTPATIENT)
Dept: ADMINISTRATIVE | Facility: HOSPITAL | Age: 50
End: 2025-02-17
Payer: MEDICARE

## 2025-02-18 ENCOUNTER — OFFICE VISIT (OUTPATIENT)
Dept: FAMILY MEDICINE | Facility: CLINIC | Age: 50
End: 2025-02-18
Payer: MEDICARE

## 2025-02-18 ENCOUNTER — LAB VISIT (OUTPATIENT)
Dept: LAB | Facility: HOSPITAL | Age: 50
End: 2025-02-18
Attending: INTERNAL MEDICINE
Payer: MEDICARE

## 2025-02-18 VITALS
TEMPERATURE: 98 F | BODY MASS INDEX: 31.99 KG/M2 | SYSTOLIC BLOOD PRESSURE: 102 MMHG | DIASTOLIC BLOOD PRESSURE: 76 MMHG | OXYGEN SATURATION: 95 % | HEIGHT: 55 IN | WEIGHT: 138.25 LBS | HEART RATE: 76 BPM

## 2025-02-18 DIAGNOSIS — E03.4 HYPOTHYROIDISM DUE TO ACQUIRED ATROPHY OF THYROID: ICD-10-CM

## 2025-02-18 DIAGNOSIS — E78.2 MIXED HYPERLIPIDEMIA: ICD-10-CM

## 2025-02-18 DIAGNOSIS — E66.01 SEVERE OBESITY (BMI 35.0-39.9) WITH COMORBIDITY: ICD-10-CM

## 2025-02-18 DIAGNOSIS — E03.4 HYPOTHYROIDISM DUE TO ACQUIRED ATROPHY OF THYROID: Primary | ICD-10-CM

## 2025-02-18 DIAGNOSIS — R41.3 MEMORY CHANGE: ICD-10-CM

## 2025-02-18 DIAGNOSIS — Q90.9 DOWN SYNDROME: ICD-10-CM

## 2025-02-18 LAB — TSH SERPL DL<=0.005 MIU/L-ACNC: 2.82 UIU/ML (ref 0.4–4)

## 2025-02-18 PROCEDURE — 36415 COLL VENOUS BLD VENIPUNCTURE: CPT | Mod: HCNC,PO | Performed by: INTERNAL MEDICINE

## 2025-02-18 PROCEDURE — 84443 ASSAY THYROID STIM HORMONE: CPT | Mod: HCNC | Performed by: INTERNAL MEDICINE

## 2025-02-18 RX ORDER — DONEPEZIL HYDROCHLORIDE 5 MG/1
5 TABLET, FILM COATED ORAL NIGHTLY
Qty: 30 TABLET | Refills: 11 | Status: SHIPPED | OUTPATIENT
Start: 2025-02-18 | End: 2026-02-18

## 2025-02-18 NOTE — PROGRESS NOTES
Subjective:       Patient ID: Ira Cooley is a 49 y.o. female.  Chief Complaint: Hyperlipidemia     HPI    Down syndrome - lives with her mother who cares for her.  Exercising 4-5x per week with mother.    Obesity - eats poorly   Hypothryoid -  Controlled  HLD - uncontrolled LDL; high triglycerides    Mother c/o patient has decrease in memory.  Goes to get her coffee and comes back with out anything.  Has to tell and remind her several times which is out of character.     ER for abdominal pain.  Dx with colitis and UTI w hematuria.  Ex Augmentin.  All symptoms resolved.    Assessment:       1. Hypothyroidism due to acquired atrophy of thyroid    2. Mixed hyperlipidemia    3. Severe obesity (BMI 35.0-39.9) with comorbidity    4. Down syndrome    5. Memory change        Plan:       Hypothyroidism due to acquired atrophy of thyroid  -     TSH; Future; Expected date: 02/18/2025  -     TSH; Future; Expected date: 08/17/2025    Mixed hyperlipidemia  -     Lipid Panel; Future; Expected date: 08/17/2025  -     Comprehensive Metabolic Panel; Future; Expected date: 08/17/2025    Severe obesity (BMI 35.0-39.9) with comorbidity    Down syndrome  -     donepeziL (ARICEPT) 5 MG tablet; Take 1 tablet (5 mg total) by mouth every evening.  Dispense: 30 tablet; Refill: 11    Memory change  -     donepeziL (ARICEPT) 5 MG tablet; Take 1 tablet (5 mg total) by mouth every evening.  Dispense: 30 tablet; Refill: 11            If TSH wnl, start Aricept.  If TSH abnormal, hold Aricept    Visit today included increased complexity associated with the care of the episodic problem hypothyroid addressed and managing the longitudinal care of the patient due to the serious and/or complex managed problem(s) hypothyroid .  Continue current management and monitor.  Other diagnoses were reviewed and found stable and will continue to monitor.  Counseled on regular exercise, maintenance of a healthy weight, balanced diet rich in fruits/vegetables  and lean protein, and avoidance of unhealthy habits like smoking and excessive alcohol intake.   Also, counseled on importance of being compliant with medication, health appointments, diet and exercise.     Follow up in about 6 months (around 8/18/2025).  Tsh today      Medication List with Changes/Refills   New Medications    DONEPEZIL (ARICEPT) 5 MG TABLET    Take 1 tablet (5 mg total) by mouth every evening.   Current Medications    BIFIDOBACTERIUM INFANTIS (ALIGN ORAL)    Take by mouth.    BIOTIN ORAL    Take 1 tablet by mouth once daily.    ERGOCALCIFEROL, VITAMIN D2, (VITAMIN D2 ORAL)    Take by mouth once daily.    LEVOCETIRIZINE (XYZAL) 5 MG TABLET    Take 1 tablet (5 mg total) by mouth every evening.    LEVOTHYROXINE (SYNTHROID) 25 MCG TABLET    Take 1 tablet (25 mcg total) by mouth once daily.    MULTIVIT WITH CALCIUM,IRON,MIN (MULTIPLE VITAMIN, WOMENS ORAL)    Take by mouth.    OMEPRAZOLE (PRILOSEC) 20 MG CAPSULE    Take 1 capsule (20 mg total) by mouth once daily.    ONDANSETRON (ZOFRAN-ODT) 4 MG TBDL    Take 1 tablet (4 mg total) by mouth every 8 (eight) hours as needed.    PRAVASTATIN (PRAVACHOL) 40 MG TABLET    TAKE 1 TABLET EVERY DAY       BP Readings from Last 3 Encounters:   02/18/25 102/76   02/03/25 (!) 118/94   08/15/24 120/70     Hemoglobin A1C   Date Value Ref Range Status   01/27/2024 5.6 4.0 - 5.6 % Final     Comment:     ADA Screening Guidelines:  5.7-6.4%  Consistent with prediabetes  >or=6.5%  Consistent with diabetes    High levels of fetal hemoglobin interfere with the HbA1C  assay. Heterozygous hemoglobin variants (HbS, HgC, etc)do  not significantly interfere with this assay.   However, presence of multiple variants may affect accuracy.     06/01/2017 5.8 4.5 - 6.2 % Final     Comment:     According to ADA guidelines, hemoglobin A1C <7.0% represents  optimal control in non-pregnant diabetic patients.  Different  metrics may apply to specific populations.   Standards of Medical Care in  Diabetes - 2016.  For the purpose of screening for the presence of diabetes:  <5.7%     Consistent with the absence of diabetes  5.7-6.4%  Consistent with increasing risk for diabetes   (prediabetes)  >or=6.5%  Consistent with diabetes  Currently no consensus exists for use of hemoglobin A1C  for diagnosis of diabetes for children.     07/09/2013 5.7 4.0 - 6.2 % Final     Lab Results   Component Value Date    TSH 3.405 07/30/2024     Lab Results   Component Value Date    LDLCALC 96.2 07/30/2024    LDLCALC 118.4 01/27/2024    LDLCALC 154.0 07/25/2023     Lab Results   Component Value Date    TRIG 239 (H) 07/30/2024    TRIG 328 (H) 01/27/2024    TRIG 215 (H) 07/25/2023     Wt Readings from Last 3 Encounters:   02/18/25 62.7 kg (138 lb 3.7 oz)   02/03/25 59 kg (130 lb)   12/10/24 60.7 kg (133 lb 13.1 oz)     Lab Results   Component Value Date    HGB 14.3 02/03/2025    HCT 43.5 02/03/2025    WBC 11.65 02/03/2025    ALT 37 02/03/2025    AST 40 02/03/2025     02/03/2025    K 3.9 02/03/2025    CREATININE 0.64 02/03/2025           Review of Systems        Objective:      Vitals:    02/18/25 1404   BP: 102/76   Pulse: 76   Temp: 98 °F (36.7 °C)     Physical Exam  Vitals reviewed.   Constitutional:       Appearance: Normal appearance.   Eyes:      Conjunctiva/sclera: Conjunctivae normal.   Cardiovascular:      Rate and Rhythm: Normal rate.   Pulmonary:      Effort: Pulmonary effort is normal.      Breath sounds: Normal breath sounds.   Abdominal:      General: Bowel sounds are normal.      Palpations: Abdomen is soft.      Tenderness: There is no abdominal tenderness.   Musculoskeletal:      Cervical back: Normal range of motion.      Comments: Normal ROM bilateral    Skin:     General: Skin is warm and dry.   Neurological:      Mental Status: She is alert.      Cranial Nerves: Cranial nerve deficit: grossly intact.   Psychiatric:      Comments: Alert and orientated

## 2025-02-19 ENCOUNTER — RESULTS FOLLOW-UP (OUTPATIENT)
Dept: FAMILY MEDICINE | Facility: CLINIC | Age: 50
End: 2025-02-19

## 2025-02-24 DIAGNOSIS — Z00.00 ENCOUNTER FOR MEDICARE ANNUAL WELLNESS EXAM: ICD-10-CM

## 2025-02-25 NOTE — PROGRESS NOTES
Subjective     Patient ID: Ira Cooley is a 49 y.o. female.    Chief Complaint: Follow-up    Ira is in today, with her mother, for routine follow-up.  They have quite a few nutrition questions.(or notification.  Or complaints.  Etc.)  She does not seem to want to eat vegetables anymore.  She did not care for the smooth these.  Main protein sources chicken nuggets.    They do say the omeprazole helps. (and requested a prescription at [facility] well, giving them a three-month supply).    Ira still will not consent to colonoscopy (or at least not to the prep).  (her mother thinks she might do the stool test).      Review of Systems   Constitutional:  Negative for appetite change, fever and unexpected weight change.   HENT: Negative.  Negative for mouth sores, sore throat and trouble swallowing.    Eyes: Negative.    Respiratory: Negative.  Negative for cough and choking.    Cardiovascular: Negative.  Negative for chest pain and leg swelling.   Gastrointestinal:  Positive for reflux. Negative for abdominal distention, abdominal pain, anal bleeding, blood in stool, constipation, diarrhea, nausea and vomiting.        Occasional indigestion.   Genitourinary: Negative.    Musculoskeletal: Negative.    Integumentary:  Negative for color change and rash. Negative.   Neurological: Negative.    Hematological:  Negative for adenopathy.   Psychiatric/Behavioral: Negative.            Objective     Physical Exam  Vitals and nursing note reviewed.   Constitutional:       General: She is not in acute distress.     Appearance: Normal appearance. She is well-developed. She is obese.   HENT:      Mouth/Throat:      Mouth: Mucous membranes are moist.      Pharynx: No oropharyngeal exudate.   Eyes:      General: No scleral icterus.  Neck:      Thyroid: No thyromegaly.      Trachea: No tracheal deviation.   Cardiovascular:      Rate and Rhythm: Normal rate and regular rhythm.      Heart sounds: Normal heart sounds.   Pulmonary:       Effort: Pulmonary effort is normal.      Breath sounds: Normal breath sounds.   Abdominal:      General: Bowel sounds are normal. There is no distension.      Palpations: Abdomen is soft. There is no mass.      Tenderness: There is no abdominal tenderness. There is no guarding.   Lymphadenopathy:      Cervical: No cervical adenopathy.   Skin:     General: Skin is warm and dry.      Findings: No rash.   Neurological:      General: No focal deficit present.      Mental Status: She is alert. Mental status is at baseline.   Psychiatric:         Mood and Affect: Mood normal.         Behavior: Behavior normal.            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  Resume the probiotic, align.  We will attempt to do the stool test (Cologuard, FIT)  RTC 6 months, sooner p.r.n..

## 2025-03-20 ENCOUNTER — TELEPHONE (OUTPATIENT)
Dept: GASTROENTEROLOGY | Facility: CLINIC | Age: 50
End: 2025-03-20
Payer: MEDICARE

## 2025-03-20 NOTE — TELEPHONE ENCOUNTER
----- Message from Robb sent at 3/20/2025 11:02 AM CDT -----  Type:  Needs Medical AdviceWho Called: pt mother Yessenia Symptoms (please be specific): unclear notification  Would the patient rather a call back or a response via [facility]? Call back Best Call Back Number: [phone] Additional Information:  please call to advise, Thank You.

## 2025-05-02 DIAGNOSIS — E03.4 HYPOTHYROIDISM DUE TO ACQUIRED ATROPHY OF THYROID: ICD-10-CM

## 2025-05-02 RX ORDER — LEVOTHYROXINE SODIUM 25 UG/1
25 TABLET ORAL DAILY
Qty: 90 TABLET | Refills: 3 | Status: SHIPPED | OUTPATIENT
Start: 2025-05-02

## 2025-05-02 NOTE — TELEPHONE ENCOUNTER
Refill Decision Note   Ira Cooley  is requesting a refill authorization.  Brief Assessment and Rationale for Refill:  Approve     Medication Therapy Plan:  FLOS      Comments:     Note composed:1:02 PM 05/02/2025

## 2025-05-02 NOTE — TELEPHONE ENCOUNTER
Care Due:                  Date            Visit Type   Department     Provider  --------------------------------------------------------------------------------                                EP -                              PRIMARY      [facility]  Last Visit: 02-      CARE ([facility])   JONI Pretty                               -                              [facility]  Next Visit: 08-      CARE ([facility])   JONI Pretty                                                            Last  Test          Frequency    Reason                     Performed    Due Date  --------------------------------------------------------------------------------    Lipid Panel.  12 months..  pravastatin..............  07- 07-    [facility] Embedded Care Due Messages. Reference number: [id number].   5/02/2025 12:37:17 PM CDT

## 2025-05-06 DIAGNOSIS — E03.4 HYPOTHYROIDISM DUE TO ACQUIRED ATROPHY OF THYROID: ICD-10-CM

## 2025-05-06 RX ORDER — PRAVASTATIN SODIUM 40 MG/1
TABLET ORAL
Qty: 90 TABLET | Refills: 1 | Status: SHIPPED | OUTPATIENT
Start: 2025-05-06

## 2025-05-06 NOTE — TELEPHONE ENCOUNTER
No care due was identified.  [facility] Embedded Care Due Messages. Reference number: [id number].   5/06/2025 2:42:51 PM CDT

## 2025-05-06 NOTE — TELEPHONE ENCOUNTER
----- Message from Shannan sent at 5/6/2025  2:28 PM CDT -----  Contact: mom  Type:  Needs Medical AdviceWho Called: mom mildredSymptoms (please be specific): needs a refill on rx,levothyroxine (SYNTHROID) 25 MCG tablet and pravastatin (PRAVACHOL) 40 MG tabletPharmacy name and phone #:  [facility] - [place], [place] - [address] [facility] [address]Oqmae: [phone] Fax: [phone] the patient rather a call back or a response via [facility]? callBest Call Back Number: [phone] (home) Additional Information: please advise and thank you.

## 2025-05-07 NOTE — TELEPHONE ENCOUNTER
Refill Decision Note   Ira Cooley  is requesting a refill authorization.  Brief Assessment and Rationale for Refill:  Approve     Medication Therapy Plan:         Comments:     Note composed:7:37 PM 05/06/2025             Appointments     Last Visit   2/18/2025 Sebastien Pretty MD   Next Visit   8/25/2025 Sebastien Pretty MD

## 2025-06-03 ENCOUNTER — OFFICE VISIT (OUTPATIENT)
Dept: GASTROENTEROLOGY | Facility: CLINIC | Age: 50
End: 2025-06-03
Payer: MEDICARE

## 2025-06-03 VITALS — WEIGHT: 138 LBS | HEIGHT: 55 IN | BODY MASS INDEX: 31.94 KG/M2

## 2025-06-03 DIAGNOSIS — K21.9 GASTROESOPHAGEAL REFLUX DISEASE WITHOUT ESOPHAGITIS: ICD-10-CM

## 2025-06-03 DIAGNOSIS — Z80.0 FAMILY HISTORY OF COLON CANCER IN FATHER: ICD-10-CM

## 2025-06-03 DIAGNOSIS — Q90.9 DOWN SYNDROME: ICD-10-CM

## 2025-06-03 DIAGNOSIS — Z79.899 ENCOUNTER FOR MONITORING LONG-TERM PROTON PUMP INHIBITOR THERAPY: Primary | ICD-10-CM

## 2025-06-03 DIAGNOSIS — Z51.81 ENCOUNTER FOR MONITORING LONG-TERM PROTON PUMP INHIBITOR THERAPY: Primary | ICD-10-CM

## 2025-06-03 PROCEDURE — 3008F BODY MASS INDEX DOCD: CPT | Mod: CPTII,S$GLB,, | Performed by: NURSE PRACTITIONER

## 2025-06-03 PROCEDURE — 1159F MED LIST DOCD IN RCRD: CPT | Mod: CPTII,S$GLB,, | Performed by: NURSE PRACTITIONER

## 2025-06-03 PROCEDURE — 99214 OFFICE O/P EST MOD 30 MIN: CPT | Mod: S$GLB,,, | Performed by: NURSE PRACTITIONER

## 2025-06-03 PROCEDURE — 1160F RVW MEDS BY RX/DR IN RCRD: CPT | Mod: CPTII,S$GLB,, | Performed by: NURSE PRACTITIONER

## 2025-06-03 PROCEDURE — 99999 PR PBB SHADOW E&M-EST. PATIENT-LVL III: CPT | Mod: PBBFAC,,, | Performed by: NURSE PRACTITIONER

## 2025-07-14 ENCOUNTER — PATIENT MESSAGE (OUTPATIENT)
Dept: ADMINISTRATIVE | Facility: HOSPITAL | Age: 50
End: 2025-07-14
Payer: MEDICARE

## 2025-07-22 RX ORDER — PRAVASTATIN SODIUM 40 MG/1
TABLET ORAL
Qty: 100 TABLET | Refills: 0 | OUTPATIENT
Start: 2025-07-22

## 2025-07-23 NOTE — TELEPHONE ENCOUNTER
Refill Decision Note   Ira Cooley  is requesting a refill authorization.  Brief Assessment and Rationale for Refill:  Quick Discontinue     Medication Therapy Plan: [facility] is requesting new scripts for the following medications without required information, (sig/ frequency/qty/etc)        Comments:   Pharmacies have been requesting medications for patients without required information, (sig, frequency, qty, etc.). In addition, requests are sent for medication(s) pt. are currently not taking, and medications patients have never taken.    We have spoken to the pharmacies about these request types and advised their teams previously that we are unable to assess these New Script requests and require all details for these requests. This is a known issue and has been reported.   Note composed:9:44 PM 07/22/2025           
Care Due:                  Date            Visit Type   Department     Provider  --------------------------------------------------------------------------------                                EP -                              PRIMARY      [facility]  Last Visit: 02-      CARE ([facility])   JONI Pretty                               -                              [facility]  Next Visit: 08-      CARE ([facility])   JONI Pretty                                                            Last  Test          Frequency    Reason                     Performed    Due Date  --------------------------------------------------------------------------------    Lipid Panel.  12 months..  pravastatin..............  07- 07-    [facility] Embedded Care Due Messages. Reference number: [id number].   7/22/2025 3:46:06 PM CDT  
05-Aug-2017

## 2025-08-08 ENCOUNTER — TELEPHONE (OUTPATIENT)
Dept: FAMILY MEDICINE | Facility: CLINIC | Age: 50
End: 2025-08-08
Payer: MEDICARE

## 2025-08-08 DIAGNOSIS — R41.3 MEMORY CHANGE: Primary | ICD-10-CM

## 2025-08-08 DIAGNOSIS — Q90.9 DOWN SYNDROME: ICD-10-CM

## 2025-08-08 NOTE — TELEPHONE ENCOUNTER
Copied from CRM #[id number]. Topic: General Inquiry - Patient Advice  >> Aug 7, 2025  4:17 PM Steph wrote:  Type:  Patient Requesting Referral    Who Called:Pt Mom   Does the patient already have the specialty appointment scheduled?:NO  If yes, what is the date of that appointment?:NA  Referral to What Specialty:Neurology   Reason for Referral:Forgetting things   Does the patient want the referral with a specific physician?:Dr. Newman   [facility] the [facility]   Patient Requesting a Response?:Yes  Would the patient rather a call back or a response via [facility]?  Call Back   Best Call Back Number:[phone]   Additional Information: Pt mom called to get a referral sent to Dr. Newman

## 2025-08-18 ENCOUNTER — LAB VISIT (OUTPATIENT)
Dept: LAB | Facility: HOSPITAL | Age: 50
End: 2025-08-18
Attending: INTERNAL MEDICINE
Payer: MEDICARE

## 2025-08-18 DIAGNOSIS — E03.4 HYPOTHYROIDISM DUE TO ACQUIRED ATROPHY OF THYROID: ICD-10-CM

## 2025-08-18 DIAGNOSIS — Z51.81 ENCOUNTER FOR MONITORING LONG-TERM PROTON PUMP INHIBITOR THERAPY: ICD-10-CM

## 2025-08-18 DIAGNOSIS — Z79.899 ENCOUNTER FOR MONITORING LONG-TERM PROTON PUMP INHIBITOR THERAPY: ICD-10-CM

## 2025-08-18 DIAGNOSIS — E78.2 MIXED HYPERLIPIDEMIA: ICD-10-CM

## 2025-08-18 LAB
ALBUMIN SERPL BCP-MCNC: 3.7 G/DL (ref 3.5–5.2)
ALP SERPL-CCNC: 107 UNIT/L (ref 40–150)
ALT SERPL W/O P-5'-P-CCNC: 29 UNIT/L (ref 0–55)
ANION GAP (OHS): 11 MMOL/L (ref 8–16)
AST SERPL-CCNC: 35 UNIT/L (ref 0–50)
BILIRUB SERPL-MCNC: 0.5 MG/DL (ref 0.1–1)
BUN SERPL-MCNC: 12 MG/DL (ref 6–20)
CALCIUM SERPL-MCNC: 9.1 MG/DL (ref 8.7–10.5)
CHLORIDE SERPL-SCNC: 104 MMOL/L (ref 95–110)
CHOLEST SERPL-MCNC: 188 MG/DL (ref 120–199)
CHOLEST/HDLC SERPL: 3.5 {RATIO} (ref 2–5)
CO2 SERPL-SCNC: 26 MMOL/L (ref 23–29)
CREAT SERPL-MCNC: 0.7 MG/DL (ref 0.5–1.4)
GFR SERPLBLD CREATININE-BSD FMLA CKD-EPI: >60 ML/MIN/1.73/M2
GLUCOSE SERPL-MCNC: 105 MG/DL (ref 70–110)
HDLC SERPL-MCNC: 53 MG/DL (ref 40–75)
HDLC SERPL: 28.2 % (ref 20–50)
LDLC SERPL CALC-MCNC: 102.6 MG/DL (ref 63–159)
MAGNESIUM SERPL-MCNC: 1.7 MG/DL (ref 1.6–2.6)
NONHDLC SERPL-MCNC: 135 MG/DL
POTASSIUM SERPL-SCNC: 4.2 MMOL/L (ref 3.5–5.1)
PROT SERPL-MCNC: 7 GM/DL (ref 6–8.4)
SODIUM SERPL-SCNC: 141 MMOL/L (ref 136–145)
TRIGL SERPL-MCNC: 162 MG/DL (ref 30–150)
TSH SERPL-ACNC: 2.13 UIU/ML (ref 0.4–4)
VIT B12 SERPL-MCNC: 247 PG/ML (ref 210–950)

## 2025-08-18 PROCEDURE — 83735 ASSAY OF MAGNESIUM: CPT | Mod: HCNC

## 2025-08-18 PROCEDURE — 84443 ASSAY THYROID STIM HORMONE: CPT | Mod: HCNC

## 2025-08-18 PROCEDURE — 36415 COLL VENOUS BLD VENIPUNCTURE: CPT | Mod: HCNC,PO

## 2025-08-18 PROCEDURE — 80061 LIPID PANEL: CPT | Mod: HCNC

## 2025-08-18 PROCEDURE — 80053 COMPREHEN METABOLIC PANEL: CPT | Mod: HCNC

## 2025-08-18 PROCEDURE — 82607 VITAMIN B-12: CPT | Mod: HCNC

## 2025-08-25 ENCOUNTER — OFFICE VISIT (OUTPATIENT)
Dept: FAMILY MEDICINE | Facility: CLINIC | Age: 50
End: 2025-08-25
Payer: MEDICARE

## 2025-08-25 VITALS
SYSTOLIC BLOOD PRESSURE: 100 MMHG | DIASTOLIC BLOOD PRESSURE: 68 MMHG | WEIGHT: 136 LBS | OXYGEN SATURATION: 96 % | TEMPERATURE: 98 F | HEIGHT: 55 IN | HEART RATE: 89 BPM | BODY MASS INDEX: 31.47 KG/M2

## 2025-08-25 DIAGNOSIS — E66.9 OBESITY (BMI 30-39.9): ICD-10-CM

## 2025-08-25 DIAGNOSIS — Z79.899 ENCOUNTER FOR LONG-TERM (CURRENT) USE OF MEDICATIONS: ICD-10-CM

## 2025-08-25 DIAGNOSIS — E78.2 MIXED HYPERLIPIDEMIA: ICD-10-CM

## 2025-08-25 DIAGNOSIS — Z71.85 VACCINE COUNSELING: ICD-10-CM

## 2025-08-25 DIAGNOSIS — Z12.31 ENCOUNTER FOR SCREENING MAMMOGRAM FOR BREAST CANCER: ICD-10-CM

## 2025-08-25 DIAGNOSIS — Z12.11 SCREEN FOR COLON CANCER: ICD-10-CM

## 2025-08-25 DIAGNOSIS — E03.4 HYPOTHYROIDISM DUE TO ACQUIRED ATROPHY OF THYROID: ICD-10-CM

## 2025-08-25 DIAGNOSIS — Z00.00 ROUTINE PHYSICAL EXAMINATION: Primary | ICD-10-CM

## 2025-08-25 PROCEDURE — 3008F BODY MASS INDEX DOCD: CPT | Mod: CPTII,HCNC,S$GLB, | Performed by: INTERNAL MEDICINE

## 2025-08-25 PROCEDURE — 90677 PCV20 VACCINE IM: CPT | Mod: HCNC,S$GLB,, | Performed by: INTERNAL MEDICINE

## 2025-08-25 PROCEDURE — 1159F MED LIST DOCD IN RCRD: CPT | Mod: CPTII,HCNC,S$GLB, | Performed by: INTERNAL MEDICINE

## 2025-08-25 PROCEDURE — 3078F DIAST BP <80 MM HG: CPT | Mod: CPTII,HCNC,S$GLB, | Performed by: INTERNAL MEDICINE

## 2025-08-25 PROCEDURE — G0009 ADMIN PNEUMOCOCCAL VACCINE: HCPCS | Mod: HCNC,S$GLB,, | Performed by: INTERNAL MEDICINE

## 2025-08-25 PROCEDURE — 1160F RVW MEDS BY RX/DR IN RCRD: CPT | Mod: CPTII,HCNC,S$GLB, | Performed by: INTERNAL MEDICINE

## 2025-08-25 PROCEDURE — 99999 PR PBB SHADOW E&M-EST. PATIENT-LVL IV: CPT | Mod: PBBFAC,HCNC,, | Performed by: INTERNAL MEDICINE

## 2025-08-25 PROCEDURE — 3074F SYST BP LT 130 MM HG: CPT | Mod: CPTII,HCNC,S$GLB, | Performed by: INTERNAL MEDICINE

## 2025-08-25 PROCEDURE — G0439 PPPS, SUBSEQ VISIT: HCPCS | Mod: HCNC,S$GLB,, | Performed by: INTERNAL MEDICINE
